# Patient Record
Sex: FEMALE | Race: WHITE | NOT HISPANIC OR LATINO | Employment: OTHER | URBAN - METROPOLITAN AREA
[De-identification: names, ages, dates, MRNs, and addresses within clinical notes are randomized per-mention and may not be internally consistent; named-entity substitution may affect disease eponyms.]

---

## 2023-03-07 ENCOUNTER — NURSING HOME VISIT (OUTPATIENT)
Dept: POST ACUTE CARE | Facility: EXTERNAL LOCATION | Age: 78
End: 2023-03-07
Payer: MEDICARE

## 2023-03-07 DIAGNOSIS — N30.00 ACUTE CYSTITIS WITHOUT HEMATURIA: Primary | ICD-10-CM

## 2023-03-07 PROBLEM — G31.83 DEMENTIA WITH LEWY BODIES (MULTI): Status: ACTIVE | Noted: 2023-03-07

## 2023-03-07 PROBLEM — I10 HTN (HYPERTENSION): Status: ACTIVE | Noted: 2023-03-07

## 2023-03-07 PROBLEM — D50.9 IDA (IRON DEFICIENCY ANEMIA): Status: ACTIVE | Noted: 2023-03-07

## 2023-03-07 PROBLEM — H35.53: Status: ACTIVE | Noted: 2023-03-07

## 2023-03-07 PROBLEM — D64.9 ANEMIA: Status: ACTIVE | Noted: 2023-03-07

## 2023-03-07 PROBLEM — J30.2 SEASONAL ALLERGIES: Status: ACTIVE | Noted: 2023-03-07

## 2023-03-07 PROBLEM — F78.A9: Status: ACTIVE | Noted: 2023-03-07

## 2023-03-07 PROBLEM — E55.9 VITAMIN D DEFICIENCY: Status: ACTIVE | Noted: 2023-03-07

## 2023-03-07 PROBLEM — Z15.1: Status: ACTIVE | Noted: 2023-03-07

## 2023-03-07 PROBLEM — I82.409 DVT (DEEP VENOUS THROMBOSIS) (MULTI): Status: RESOLVED | Noted: 2023-03-07 | Resolved: 2023-03-07

## 2023-03-07 PROBLEM — E78.5 HLD (HYPERLIPIDEMIA): Status: ACTIVE | Noted: 2023-03-07

## 2023-03-07 PROBLEM — E03.9 HYPOTHYROIDISM: Status: ACTIVE | Noted: 2023-03-07

## 2023-03-07 PROBLEM — E53.8 VITAMIN B12 DEFICIENCY: Status: ACTIVE | Noted: 2023-03-07

## 2023-03-07 PROBLEM — Q89.7: Status: ACTIVE | Noted: 2023-03-07

## 2023-03-07 PROBLEM — M54.2 CHRONIC NECK PAIN: Status: ACTIVE | Noted: 2023-03-07

## 2023-03-07 PROBLEM — F02.80 DEMENTIA WITH LEWY BODIES (MULTI): Status: ACTIVE | Noted: 2023-03-07

## 2023-03-07 PROBLEM — N18.30 CKD (CHRONIC KIDNEY DISEASE), STAGE III (MULTI): Status: ACTIVE | Noted: 2023-03-07

## 2023-03-07 PROBLEM — M19.90 OSTEOARTHRITIS: Status: ACTIVE | Noted: 2023-03-07

## 2023-03-07 PROBLEM — Q04.0: Status: ACTIVE | Noted: 2023-03-07

## 2023-03-07 PROBLEM — A04.72 C. DIFFICILE DIARRHEA: Status: RESOLVED | Noted: 2023-03-07 | Resolved: 2023-03-07

## 2023-03-07 PROBLEM — M35.00 SJOGREN'S DISEASE (MULTI): Status: ACTIVE | Noted: 2023-03-07

## 2023-03-07 PROBLEM — F32.A DEPRESSION: Status: ACTIVE | Noted: 2023-03-07

## 2023-03-07 PROBLEM — G89.29 CHRONIC NECK PAIN: Status: ACTIVE | Noted: 2023-03-07

## 2023-03-07 PROCEDURE — 99348 HOME/RES VST EST LOW MDM 30: CPT | Performed by: NURSE PRACTITIONER

## 2023-03-07 NOTE — LETTER
Subjective  Patient ID: Alpa Gonzalez is a 77 y.o. female who is assisted living/ home patient being seen and evaluated for multiple medical problems.    UA C&S reviewed and positive for UTI         Review of Systems    Objective  There were no vitals taken for this visit.    Physical Exam  Vitals and nursing note reviewed.   Constitutional:       General: She is not in acute distress.  HENT:      Head: Normocephalic and atraumatic.   Pulmonary:      Effort: Pulmonary effort is normal.   Musculoskeletal:         General: No swelling.   Skin:     General: Skin is warm and dry.   Neurological:      Mental Status: She is alert.   Psychiatric:         Mood and Affect: Mood normal.         Assessment/Plan  Problem List Items Addressed This Visit    None  Visit Diagnoses       Acute cystitis without hematuria    -  Primary    started on bactrim D BID x 7 days             Goals    None

## 2023-04-17 PROCEDURE — G0180 MD CERTIFICATION HHA PATIENT: HCPCS | Performed by: INTERNAL MEDICINE

## 2023-04-20 ENCOUNTER — NURSING HOME VISIT (OUTPATIENT)
Dept: POST ACUTE CARE | Facility: EXTERNAL LOCATION | Age: 78
End: 2023-04-20
Payer: MEDICARE

## 2023-04-20 DIAGNOSIS — T14.8XXA SCRATCHING: ICD-10-CM

## 2023-04-20 DIAGNOSIS — F02.B0 MODERATE LEWY BODY DEMENTIA WITHOUT BEHAVIORAL DISTURBANCE, PSYCHOTIC DISTURBANCE, MOOD DISTURBANCE, OR ANXIETY (MULTI): Primary | ICD-10-CM

## 2023-04-20 DIAGNOSIS — T14.8XXA: ICD-10-CM

## 2023-04-20 DIAGNOSIS — L29.9 ITCHING: ICD-10-CM

## 2023-04-20 DIAGNOSIS — G31.83 MODERATE LEWY BODY DEMENTIA WITHOUT BEHAVIORAL DISTURBANCE, PSYCHOTIC DISTURBANCE, MOOD DISTURBANCE, OR ANXIETY (MULTI): Primary | ICD-10-CM

## 2023-04-20 PROCEDURE — 99348 HOME/RES VST EST LOW MDM 30: CPT | Performed by: NURSE PRACTITIONER

## 2023-04-20 NOTE — PROGRESS NOTES
Subjective   Alpa Gonzalez is a 77 y.o. female who is assisted living/ home patient being seen and evaluated for multiple medical problems.    There were multiple medical problems reviewed. Medications, labs and orders reviewed. Patient seen and evaluated,   discussed with nursing staff,discussed with patient and provided information.     Persistent scratching of upper chest and LT shoulder area causing multiple open wounds, no infection or rash noted. Possibly secondary to anxiety. She states she feels itchy there. Current medications reviewed;  Duloxetine, bupropion, trazodone and levothyroxine. Recent blood work in Feb okay             Objective       Physical Exam  Vitals and nursing note reviewed.   Constitutional:       General: She is not in acute distress.  HENT:      Head: Normocephalic and atraumatic.   Pulmonary:      Effort: Pulmonary effort is normal.   Musculoskeletal:         General: No swelling.   Skin:     General: Skin is warm and dry.      Findings: No rash.      Comments: Multiple open wounds to upper chest and LT shoulder area  No rash or infection   Neurological:      Mental Status: She is alert. She is disoriented.   Psychiatric:         Mood and Affect: Mood normal.         Assessment/Plan   Problem List Items Addressed This Visit       Moderate Lewy body dementia without behavioral disturbance, psychotic disturbance, mood disturbance, or anxiety (CMS/[facility]) - Primary     Resident stable and problem well controlled. Will continue same orders and treatment         Multiple open wounds of multiple body regions     Non infected open wounds to upper chest and LT shoulder secondary from persistent scratching by resident, no rash, possibly dry skin verses a nervous habit/anxiety.  Start CerVa cream daily and after showers  Start cortisone 1% cream BID x 14 days            Other Visit Diagnoses       Itching        Scratching

## 2023-04-20 NOTE — ASSESSMENT & PLAN NOTE
Non infected open wounds to upper chest and LT shoulder secondary from persistent scratching by resident, no rash, possibly dry skin verses a nervous habit/anxiety.  Start CerVa cream daily and after showers  Start cortisone 1% cream BID x 14 days

## 2023-04-20 NOTE — LETTER
Patient: Alpa Gonzalez  : 1945    Encounter Date: 2023    Subjective  Alpa Gonzalez is a 77 y.o. female who is assisted living/ home patient being seen and evaluated for multiple medical problems.    There were multiple medical problems reviewed. Medications, labs and orders reviewed. Patient seen and evaluated,   discussed with nursing staff,discussed with patient and provided information.     Persistent scratching of upper chest and LT shoulder area causing multiple open wounds, no infection or rash noted. Possibly secondary to anxiety. She states she feels itchy there. Current medications reviewed;  Duloxetine, bupropion, trazodone and levothyroxine. Recent blood work in              Objective      Physical Exam  Vitals and nursing note reviewed.   Constitutional:       General: She is not in acute distress.  HENT:      Head: Normocephalic and atraumatic.   Pulmonary:      Effort: Pulmonary effort is normal.   Musculoskeletal:         General: No swelling.   Skin:     General: Skin is warm and dry.      Findings: No rash.      Comments: Multiple open wounds to upper chest and LT shoulder area  No rash or infection   Neurological:      Mental Status: She is alert. She is disoriented.   Psychiatric:         Mood and Affect: Mood normal.         Assessment/Plan  Problem List Items Addressed This Visit       Moderate Lewy body dementia without behavioral disturbance, psychotic disturbance, mood disturbance, or anxiety (CMS/[facility]) - Primary     Resident stable and problem well controlled. Will continue same orders and treatment         Multiple open wounds of multiple body regions     Non infected open wounds to upper chest and LT shoulder secondary from persistent scratching by resident, no rash, possibly dry skin verses a nervous habit/anxiety.  Start CerVa cream daily and after showers  Start cortisone 1% cream BID x 14 days            Other Visit Diagnoses       Itching        Scratching                   Electronically Signed By: ALIDA Adams-TRISTIN   4/20/23  2:51 PM

## 2023-04-25 ENCOUNTER — NURSING HOME VISIT (OUTPATIENT)
Dept: POST ACUTE CARE | Facility: EXTERNAL LOCATION | Age: 78
End: 2023-04-25
Payer: MEDICARE

## 2023-04-25 DIAGNOSIS — G31.83 MODERATE LEWY BODY DEMENTIA WITHOUT BEHAVIORAL DISTURBANCE, PSYCHOTIC DISTURBANCE, MOOD DISTURBANCE, OR ANXIETY (MULTI): ICD-10-CM

## 2023-04-25 DIAGNOSIS — T14.8XXA: Primary | ICD-10-CM

## 2023-04-25 DIAGNOSIS — F02.B0 MODERATE LEWY BODY DEMENTIA WITHOUT BEHAVIORAL DISTURBANCE, PSYCHOTIC DISTURBANCE, MOOD DISTURBANCE, OR ANXIETY (MULTI): ICD-10-CM

## 2023-04-25 PROCEDURE — 99349 HOME/RES VST EST MOD MDM 40: CPT | Performed by: NURSE PRACTITIONER

## 2023-04-25 NOTE — LETTER
Patient: Alpa Gonzalez  : 1945    Encounter Date: 2023    Subjective  Alpa Gonzalez is a 77 y.o. female who is assisted living/ home patient being seen and evaluated for multiple medical problems.    50% of time was spent on preparing to see the patient, performing exam or evaluation, coordination of care, ordering medications,tests and labs, referring and communicating with other health care providers , interpreting results, obtaining and reviewing history, counseling and educating the patient explanation of tests, medications and documenting clinical information  EMR. Time spent >35 minutes    Follow up for upper chest area itch and open wounds, not healing and still itching per resident. Consulted Dr Lozano for recommendations.              Objective      Physical Exam  Vitals and nursing note reviewed.   Constitutional:       General: She is not in acute distress.  HENT:      Head: Normocephalic and atraumatic.   Pulmonary:      Effort: Pulmonary effort is normal.   Musculoskeletal:         General: No swelling.   Skin:     General: Skin is warm and dry.      Findings: No rash.      Comments: Multiple open wounds to upper chest and LT shoulder area  No rash   Neurological:      Mental Status: She is alert. She is disoriented.   Psychiatric:         Mood and Affect: Mood normal.         Assessment/Plan  Problem List Items Addressed This Visit       Moderate Lewy body dementia without behavioral disturbance, psychotic disturbance, mood disturbance, or anxiety (CMS/[facility])     Resident stable and problem well controlled. Will continue same orders and treatment         Multiple open wounds of multiple body regions - Primary     Not healing and states area still itches, possible staph type infection but only on her upper chest area  DC cortisone cream  Start Benadryl itch cream BID x 14 days  Start Triamcinolone 0.05% BID x 14 days  Doxycycline 100 mg po Q12 hr               Electronically Signed  By: Jolene Fraser, APRN-CNP   4/25/23  5:57 PM

## 2023-04-25 NOTE — ASSESSMENT & PLAN NOTE
Not healing and states area still itches, possible staph type infection but only on her upper chest area  DC cortisone cream  Start Benadryl itch cream BID x 14 days  Start Triamcinolone 0.05% BID x 14 days  Doxycycline 100 mg po Q12 hr

## 2023-04-25 NOTE — PROGRESS NOTES
Subjective   Alpa Gonzalez is a 77 y.o. female who is assisted living/ home patient being seen and evaluated for multiple medical problems.    50% of time was spent on preparing to see the patient, performing exam or evaluation, coordination of care, ordering medications,tests and labs, referring and communicating with other health care providers , interpreting results, obtaining and reviewing history, counseling and educating the patient explanation of tests, medications and documenting clinical information  EMR. Time spent >35 minutes    Follow up for upper chest area itch and open wounds, not healing and still itching per resident. Consulted Dr Lozano for recommendations.              Objective       Physical Exam  Vitals and nursing note reviewed.   Constitutional:       General: She is not in acute distress.  HENT:      Head: Normocephalic and atraumatic.   Pulmonary:      Effort: Pulmonary effort is normal.   Musculoskeletal:         General: No swelling.   Skin:     General: Skin is warm and dry.      Findings: No rash.      Comments: Multiple open wounds to upper chest and LT shoulder area  No rash   Neurological:      Mental Status: She is alert. She is disoriented.   Psychiatric:         Mood and Affect: Mood normal.         Assessment/Plan   Problem List Items Addressed This Visit       Moderate Lewy body dementia without behavioral disturbance, psychotic disturbance, mood disturbance, or anxiety (CMS/[facility])     Resident stable and problem well controlled. Will continue same orders and treatment         Multiple open wounds of multiple body regions - Primary     Not healing and states area still itches, possible staph type infection but only on her upper chest area  DC cortisone cream  Start Benadryl itch cream BID x 14 days  Start Triamcinolone 0.05% BID x 14 days  Doxycycline 100 mg po Q12 hr

## 2023-05-11 ENCOUNTER — NURSING HOME VISIT (OUTPATIENT)
Dept: POST ACUTE CARE | Facility: EXTERNAL LOCATION | Age: 78
End: 2023-05-11
Payer: MEDICARE

## 2023-05-11 DIAGNOSIS — F02.B0 MODERATE LEWY BODY DEMENTIA WITHOUT BEHAVIORAL DISTURBANCE, PSYCHOTIC DISTURBANCE, MOOD DISTURBANCE, OR ANXIETY (MULTI): ICD-10-CM

## 2023-05-11 DIAGNOSIS — L01.00 IMPETIGO: ICD-10-CM

## 2023-05-11 DIAGNOSIS — G31.83 MODERATE LEWY BODY DEMENTIA WITHOUT BEHAVIORAL DISTURBANCE, PSYCHOTIC DISTURBANCE, MOOD DISTURBANCE, OR ANXIETY (MULTI): ICD-10-CM

## 2023-05-11 DIAGNOSIS — L29.9 LOCALIZED PRURITUS: Primary | ICD-10-CM

## 2023-05-11 PROCEDURE — 99349 HOME/RES VST EST MOD MDM 40: CPT | Performed by: NURSE PRACTITIONER

## 2023-05-11 NOTE — LETTER
Patient: Alpa Gonzalez  : 1945    Encounter Date: 2023    Subjective  Alpa Gonzalez is a 77 y.o. female who is assisted living/ home patient being seen and evaluated for multiple medical problems.    50% of time was spent on preparing to see the patient, performing exam or evaluation, coordination of care, ordering medications,tests and labs, referring and communicating with other health care providers , interpreting results, obtaining and reviewing history, counseling and educating the patient explanation of tests, medications and documenting clinical information  EMR. Time spent >35 minutes    Follow up on chest rash, she was on Doxycycline and it is somewhat improved. Most likely an impetigo. Will change cortisone topical to a topical antibiotic.              Objective  There were no vitals taken for this visit.    Physical Exam  Vitals and nursing note reviewed.   Constitutional:       General: She is not in acute distress.  HENT:      Head: Normocephalic and atraumatic.   Pulmonary:      Effort: Pulmonary effort is normal.   Musculoskeletal:         General: No swelling.   Skin:     General: Skin is warm and dry.      Findings: No rash.      Comments: Multiple lesions/vesicles remain but improved. Some erythema and crusting   Neurological:      Mental Status: She is alert. She is disoriented.   Psychiatric:         Mood and Affect: Mood normal.         Assessment/Plan  Problem List Items Addressed This Visit       Moderate Lewy body dementia without behavioral disturbance, psychotic disturbance, mood disturbance, or anxiety (CMS/[facility])     Resident stable and problem well controlled. Will continue same orders and treatment         Impetigo     Was started on doxycycline 2 weeks ago and topical cortisone, some improvement seen  DC topical cortisone  Start Mupirocin 2% topical apply to affected areas TID x 14 days   If not improving will consider another round of oral antibiotics          Other  Visit Diagnoses       Localized pruritus    -  Primary    Continue Hydroxyzine prn              Electronically Signed By: ALIDA Adams-CNP   5/11/23  4:45 PM

## 2023-05-11 NOTE — ASSESSMENT & PLAN NOTE
Was started on doxycycline 2 weeks ago and topical cortisone, some improvement seen  DC topical cortisone  Start Mupirocin 2% topical apply to affected areas TID x 14 days   If not improving will consider another round of oral antibiotics

## 2023-05-18 ENCOUNTER — NURSING HOME VISIT (OUTPATIENT)
Dept: POST ACUTE CARE | Facility: EXTERNAL LOCATION | Age: 78
End: 2023-05-18
Payer: MEDICARE

## 2023-05-18 DIAGNOSIS — F02.B0 MODERATE LEWY BODY DEMENTIA WITHOUT BEHAVIORAL DISTURBANCE, PSYCHOTIC DISTURBANCE, MOOD DISTURBANCE, OR ANXIETY (MULTI): Primary | ICD-10-CM

## 2023-05-18 DIAGNOSIS — L01.00 IMPETIGO: ICD-10-CM

## 2023-05-18 DIAGNOSIS — T14.8XXA: ICD-10-CM

## 2023-05-18 DIAGNOSIS — G31.83 MODERATE LEWY BODY DEMENTIA WITHOUT BEHAVIORAL DISTURBANCE, PSYCHOTIC DISTURBANCE, MOOD DISTURBANCE, OR ANXIETY (MULTI): Primary | ICD-10-CM

## 2023-05-18 PROCEDURE — 99348 HOME/RES VST EST LOW MDM 30: CPT | Performed by: NURSE PRACTITIONER

## 2023-05-18 NOTE — LETTER
Patient: Alpa Gonzalez  : 1945    Encounter Date: 2023    Subjective  Alpa Gonzalez is a 77 y.o. female who is assisted living/ home patient being seen and evaluated for multiple medical problems.    There were multiple medical problems reviewed.  No current issues or complaints. Medications, labs and orders reviewed. Patient seen and evaluated,   discussed with nursing staff,discussed with patient and provided information.     Resident being treated for open wounds, appearing like an impetigo. Treated with doxycycline and mupirocin topical             Objective      Physical Exam  Vitals and nursing note reviewed.   Constitutional:       General: She is not in acute distress.  HENT:      Head: Normocephalic and atraumatic.   Pulmonary:      Effort: Pulmonary effort is normal.   Skin:     General: Skin is warm and dry.      Findings: No rash.      Comments: Multiple lesions/vesicles remain but improved. Some erythema and crusting   Neurological:      Mental Status: She is alert. She is disoriented.   Psychiatric:         Mood and Affect: Mood normal.         Assessment/Plan  Problem List Items Addressed This Visit       Moderate Lewy body dementia without behavioral disturbance, psychotic disturbance, mood disturbance, or anxiety (CMS/[facility]) - Primary     Resident stable and problem well controlled. Will continue same orders and treatment         Multiple open wounds of multiple body regions    Impetigo     Improving and she states is not as itchy  Continue same treatment and reevaluate              Electronically Signed By: KYLIE Adams   23  3:25 PM

## 2023-05-18 NOTE — PROGRESS NOTES
Subjective   Alpa Gonzalez is a 77 y.o. female who is assisted living/ home patient being seen and evaluated for multiple medical problems.    There were multiple medical problems reviewed.  No current issues or complaints. Medications, labs and orders reviewed. Patient seen and evaluated,   discussed with nursing staff,discussed with patient and provided information.     Resident being treated for open wounds, appearing like an impetigo. Treated with doxycycline and mupirocin topical             Objective       Physical Exam  Vitals and nursing note reviewed.   Constitutional:       General: She is not in acute distress.  HENT:      Head: Normocephalic and atraumatic.   Pulmonary:      Effort: Pulmonary effort is normal.   Skin:     General: Skin is warm and dry.      Findings: No rash.      Comments: Multiple lesions/vesicles remain but improved. Some erythema and crusting   Neurological:      Mental Status: She is alert. She is disoriented.   Psychiatric:         Mood and Affect: Mood normal.         Assessment/Plan   Problem List Items Addressed This Visit       Moderate Lewy body dementia without behavioral disturbance, psychotic disturbance, mood disturbance, or anxiety (CMS/[facility]) - Primary     Resident stable and problem well controlled. Will continue same orders and treatment         Multiple open wounds of multiple body regions    Impetigo     Improving and she states is not as itchy  Continue same treatment and reevaluate

## 2023-05-23 ENCOUNTER — NURSING HOME VISIT (OUTPATIENT)
Dept: POST ACUTE CARE | Facility: EXTERNAL LOCATION | Age: 78
End: 2023-05-23
Payer: MEDICARE

## 2023-05-23 DIAGNOSIS — G31.83 MODERATE LEWY BODY DEMENTIA WITHOUT BEHAVIORAL DISTURBANCE, PSYCHOTIC DISTURBANCE, MOOD DISTURBANCE, OR ANXIETY (MULTI): Primary | ICD-10-CM

## 2023-05-23 DIAGNOSIS — L01.00 IMPETIGO: ICD-10-CM

## 2023-05-23 DIAGNOSIS — B37.2 CANDIDAL DERMATITIS: ICD-10-CM

## 2023-05-23 DIAGNOSIS — F02.B0 MODERATE LEWY BODY DEMENTIA WITHOUT BEHAVIORAL DISTURBANCE, PSYCHOTIC DISTURBANCE, MOOD DISTURBANCE, OR ANXIETY (MULTI): Primary | ICD-10-CM

## 2023-05-23 PROCEDURE — 99348 HOME/RES VST EST LOW MDM 30: CPT | Performed by: NURSE PRACTITIONER

## 2023-05-23 NOTE — PROGRESS NOTES
Subjective   Alpa Gonzalez is a 77 y.o. female who is assisted living/ home patient being seen and evaluated for multiple medical problems.    There were multiple medical problems reviewed.  No current issues or complaints. Medications, labs and orders reviewed. Patient seen and evaluated,   discussed with nursing staff,discussed with patient and provided information.       Upper chest rash improved greatly, has some mild rash under her breasts now             Objective   There were no vitals taken for this visit.    Physical Exam  Vitals and nursing note reviewed.   Constitutional:       General: She is not in acute distress.  HENT:      Head: Normocephalic and atraumatic.   Pulmonary:      Effort: Pulmonary effort is normal.   Skin:     General: Skin is warm and dry.      Findings: Rash present.      Comments: Multiple lesions/vesicles remain but improved  Yeast like rash under her breasts   Neurological:      Mental Status: She is alert. She is disoriented.   Psychiatric:         Mood and Affect: Mood normal.         Assessment/Plan   Problem List Items Addressed This Visit       Moderate Lewy body dementia without behavioral disturbance, psychotic disturbance, mood disturbance, or anxiety (CMS/[facility]) - Primary     Resident stable and problem well controlled. Will continue same orders and treatment         Impetigo     Improved greatly  Continue same treatment          Other Visit Diagnoses       Candidal dermatitis        start Nystatin / triamcinolone BID until clear

## 2023-05-23 NOTE — LETTER
Patient: Alpa Gonzalez  : 1945    Encounter Date: 2023    Subjective  Alpa Gonzalez is a 77 y.o. female who is assisted living/ home patient being seen and evaluated for multiple medical problems.    There were multiple medical problems reviewed.  No current issues or complaints. Medications, labs and orders reviewed. Patient seen and evaluated,   discussed with nursing staff,discussed with patient and provided information.       Upper chest rash improved greatly, has some mild rash under her breasts now             Objective  There were no vitals taken for this visit.    Physical Exam  Vitals and nursing note reviewed.   Constitutional:       General: She is not in acute distress.  HENT:      Head: Normocephalic and atraumatic.   Pulmonary:      Effort: Pulmonary effort is normal.   Skin:     General: Skin is warm and dry.      Findings: Rash present.      Comments: Multiple lesions/vesicles remain but improved  Yeast like rash under her breasts   Neurological:      Mental Status: She is alert. She is disoriented.   Psychiatric:         Mood and Affect: Mood normal.         Assessment/Plan  Problem List Items Addressed This Visit       Moderate Lewy body dementia without behavioral disturbance, psychotic disturbance, mood disturbance, or anxiety (CMS/[facility]) - Primary     Resident stable and problem well controlled. Will continue same orders and treatment         Impetigo     Improved greatly  Continue same treatment          Other Visit Diagnoses       Candidal dermatitis        start Nystatin / triamcinolone BID until clear              Electronically Signed By: KYLIE Adams   23  4:15 PM

## 2023-07-11 ENCOUNTER — NURSING HOME VISIT (OUTPATIENT)
Dept: POST ACUTE CARE | Facility: EXTERNAL LOCATION | Age: 78
End: 2023-07-11
Payer: MEDICARE

## 2023-07-11 DIAGNOSIS — M17.11 PRIMARY OSTEOARTHRITIS OF RIGHT KNEE: ICD-10-CM

## 2023-07-11 DIAGNOSIS — R52 PAIN: ICD-10-CM

## 2023-07-11 DIAGNOSIS — F02.B0 MODERATE LEWY BODY DEMENTIA WITHOUT BEHAVIORAL DISTURBANCE, PSYCHOTIC DISTURBANCE, MOOD DISTURBANCE, OR ANXIETY (MULTI): Primary | ICD-10-CM

## 2023-07-11 DIAGNOSIS — G31.83 MODERATE LEWY BODY DEMENTIA WITHOUT BEHAVIORAL DISTURBANCE, PSYCHOTIC DISTURBANCE, MOOD DISTURBANCE, OR ANXIETY (MULTI): Primary | ICD-10-CM

## 2023-07-11 PROBLEM — L01.00 IMPETIGO: Status: RESOLVED | Noted: 2023-05-11 | Resolved: 2023-07-11

## 2023-07-11 PROBLEM — T14.8XXA: Status: RESOLVED | Noted: 2023-04-20 | Resolved: 2023-07-11

## 2023-07-11 PROCEDURE — 99349 HOME/RES VST EST MOD MDM 40: CPT | Performed by: NURSE PRACTITIONER

## 2023-07-11 NOTE — LETTER
Patient: Alpa Gonzalez  : 1945    Encounter Date: 2023    Subjective  Alpa Gonzalez is a 77 y.o. female who is assisted living/ home patient being seen and evaluated for multiple medical problems.    Resident seen today for complaints of RT knee pain, X ray obtained and shows moderate DJD. She may benefit from PT/OT to help improve mobility and balance.     50% of time was spent on preparing to see the patient, performing exam or evaluation, coordination of care, ordering medications,tests and labs, referring and communicating with other health care providers , interpreting results, obtaining and reviewing history, tests, medications and documenting clinical information  EMR. Time spent >35 minutes             Objective      Physical Exam  Vitals and nursing note reviewed.   Constitutional:       General: She is not in acute distress.  HENT:      Head: Normocephalic and atraumatic.   Pulmonary:      Effort: Pulmonary effort is normal.   Musculoskeletal:         General: Tenderness present. No swelling, deformity or signs of injury.   Skin:     General: Skin is warm and dry.   Neurological:      Mental Status: She is alert. She is disoriented.   Psychiatric:         Mood and Affect: Mood normal.         Assessment/Plan  Problem List Items Addressed This Visit       Moderate Lewy body dementia without behavioral disturbance, psychotic disturbance, mood disturbance, or anxiety (CMS/HCC) - Primary     Patient stable, will continue same treatment and monitor. Nursing to inform CNP/MD of any changes in condition or new problems         Primary osteoarthritis of right knee     Consult PT/OT  Start Tylenol 1000mg po daily x 4 weeks           Other Visit Diagnoses       Pain                  Electronically Signed By: KYLIE Adams   23  4:31 PM

## 2023-07-11 NOTE — PROGRESS NOTES
Subjective   Alpa Gonzalez is a 77 y.o. female who is assisted living/ home patient being seen and evaluated for multiple medical problems.    Resident seen today for complaints of RT knee pain, X ray obtained and shows moderate DJD. She may benefit from PT/OT to help improve mobility and balance.     50% of time was spent on preparing to see the patient, performing exam or evaluation, coordination of care, ordering medications,tests and labs, referring and communicating with other health care providers , interpreting results, obtaining and reviewing history, tests, medications and documenting clinical information  EMR. Time spent >35 minutes             Objective       Physical Exam  Vitals and nursing note reviewed.   Constitutional:       General: She is not in acute distress.  HENT:      Head: Normocephalic and atraumatic.   Pulmonary:      Effort: Pulmonary effort is normal.   Musculoskeletal:         General: Tenderness present. No swelling, deformity or signs of injury.   Skin:     General: Skin is warm and dry.   Neurological:      Mental Status: She is alert. She is disoriented.   Psychiatric:         Mood and Affect: Mood normal.         Assessment/Plan   Problem List Items Addressed This Visit       Moderate Lewy body dementia without behavioral disturbance, psychotic disturbance, mood disturbance, or anxiety (CMS/HCC) - Primary     Patient stable, will continue same treatment and monitor. Nursing to inform CNP/MD of any changes in condition or new problems         Primary osteoarthritis of right knee     Consult PT/OT  Start Tylenol 1000mg po daily x 4 weeks           Other Visit Diagnoses       Pain

## 2023-08-15 ENCOUNTER — NURSING HOME VISIT (OUTPATIENT)
Dept: POST ACUTE CARE | Facility: EXTERNAL LOCATION | Age: 78
End: 2023-08-15
Payer: MEDICARE

## 2023-08-15 DIAGNOSIS — M17.11 PRIMARY OSTEOARTHRITIS OF RIGHT KNEE: Primary | ICD-10-CM

## 2023-08-15 PROCEDURE — 99348 HOME/RES VST EST LOW MDM 30: CPT | Performed by: INTERNAL MEDICINE

## 2023-08-15 NOTE — LETTER
Patient: Alpa Gonzalez  : 1945    Encounter Date: 08/15/2023    Follow-up visit  Patient is seen today because of request by staff concerning severe pain in the right knee.  This has been going on for a few weeks.  X-rays were negative for fracture but suggestive of DJD.  The patient was seen and currently her only significant complaint is severe pain in the right knee.    Medications and orders were reviewed.    Physical exam  Vital signs are stable and afebrile.  She is alert but a bit uncomfortable.  Both legs demonstrate mild to scant peripheral edema.  Patient is obese.  There is tenderness to the right knee upon palpation and attempted range of motion.  It is localized to the right knee.  There is no effusion or redness or deformity noted.    Assessment and care plan  Symptomatic osteoarthritis of the right knee.  We will increase her Tylenol to 1000 mg 3 times a day for 30 days then as needed.  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  We have also asked to have the patient scheduled with orthopedics for 1 more comprehensive evaluation and possible steroid injection or other interventions      Electronically Signed By: Angel Lozano DO   23 10:20 AM

## 2023-08-16 NOTE — PROGRESS NOTES
Follow-up visit  Patient is seen today because of request by staff concerning severe pain in the right knee.  This has been going on for a few weeks.  X-rays were negative for fracture but suggestive of DJD.  The patient was seen and currently her only significant complaint is severe pain in the right knee.    Medications and orders were reviewed.    Physical exam  Vital signs are stable and afebrile.  She is alert but a bit uncomfortable.  Both legs demonstrate mild to scant peripheral edema.  Patient is obese.  There is tenderness to the right knee upon palpation and attempted range of motion.  It is localized to the right knee.  There is no effusion or redness or deformity noted.    Assessment and care plan  Symptomatic osteoarthritis of the right knee.  We will increase her Tylenol to 1000 mg 3 times a day for 30 days then as needed.  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  We have also asked to have the patient scheduled with orthopedics for 1 more comprehensive evaluation and possible steroid injection or other interventions

## 2023-09-18 ENCOUNTER — NURSING HOME VISIT (OUTPATIENT)
Dept: POST ACUTE CARE | Facility: EXTERNAL LOCATION | Age: 78
End: 2023-09-18
Payer: MEDICARE

## 2023-09-18 VITALS
DIASTOLIC BLOOD PRESSURE: 62 MMHG | WEIGHT: 195 LBS | TEMPERATURE: 97.6 F | BODY MASS INDEX: 33.47 KG/M2 | HEART RATE: 70 BPM | SYSTOLIC BLOOD PRESSURE: 123 MMHG

## 2023-09-18 DIAGNOSIS — N18.31 STAGE 3A CHRONIC KIDNEY DISEASE (MULTI): ICD-10-CM

## 2023-09-18 DIAGNOSIS — I50.33 ACUTE ON CHRONIC DIASTOLIC CONGESTIVE HEART FAILURE (MULTI): ICD-10-CM

## 2023-09-18 DIAGNOSIS — I82.512 CHRONIC DEEP VEIN THROMBOSIS (DVT) OF FEMORAL VEIN OF LEFT LOWER EXTREMITY (MULTI): ICD-10-CM

## 2023-09-18 DIAGNOSIS — E66.9 OBESITY (BMI 30.0-34.9): ICD-10-CM

## 2023-09-18 DIAGNOSIS — I87.8 VENOUS STASIS: ICD-10-CM

## 2023-09-18 DIAGNOSIS — G31.83 MODERATE LEWY BODY DEMENTIA WITHOUT BEHAVIORAL DISTURBANCE, PSYCHOTIC DISTURBANCE, MOOD DISTURBANCE, OR ANXIETY (MULTI): Primary | ICD-10-CM

## 2023-09-18 DIAGNOSIS — F02.B0 MODERATE LEWY BODY DEMENTIA WITHOUT BEHAVIORAL DISTURBANCE, PSYCHOTIC DISTURBANCE, MOOD DISTURBANCE, OR ANXIETY (MULTI): Primary | ICD-10-CM

## 2023-09-18 PROBLEM — I10 PRIMARY HYPERTENSION: Status: ACTIVE | Noted: 2023-09-18

## 2023-09-18 PROBLEM — I78.1 TELANGIECTASIA: Status: ACTIVE | Noted: 2017-09-27

## 2023-09-18 PROBLEM — I82.412: Status: ACTIVE | Noted: 2020-02-10

## 2023-09-18 PROBLEM — R53.1 GENERALIZED WEAKNESS: Status: ACTIVE | Noted: 2021-07-21

## 2023-09-18 PROBLEM — K21.9 GERD (GASTROESOPHAGEAL REFLUX DISEASE): Status: ACTIVE | Noted: 2023-09-18

## 2023-09-18 PROBLEM — E66.813 OBESITY, CLASS III, BMI 40-49.9 (MORBID OBESITY): Status: RESOLVED | Noted: 2023-08-16 | Resolved: 2023-09-18

## 2023-09-18 PROBLEM — E03.8 OTHER SPECIFIED HYPOTHYROIDISM: Status: ACTIVE | Noted: 2023-09-18

## 2023-09-18 PROBLEM — S82.202A FRACTURE OF LEFT TIBIA: Status: RESOLVED | Noted: 2019-12-21 | Resolved: 2023-09-18

## 2023-09-18 PROBLEM — E66.813 OBESITY, CLASS III, BMI 40-49.9 (MORBID OBESITY): Status: ACTIVE | Noted: 2023-08-16

## 2023-09-18 PROBLEM — M19.90 OSTEOARTHRITIS: Status: ACTIVE | Noted: 2023-09-18

## 2023-09-18 PROBLEM — N18.30 CKD (CHRONIC KIDNEY DISEASE) STAGE 3, GFR 30-59 ML/MIN (MULTI): Status: ACTIVE | Noted: 2018-08-06

## 2023-09-18 PROBLEM — E66.811 OBESITY (BMI 30.0-34.9): Status: ACTIVE | Noted: 2023-09-18

## 2023-09-18 PROBLEM — E66.01 OBESITY, CLASS III, BMI 40-49.9 (MORBID OBESITY) (MULTI): Status: ACTIVE | Noted: 2023-08-16

## 2023-09-18 PROBLEM — H35.53: Status: ACTIVE | Noted: 2017-05-10

## 2023-09-18 PROBLEM — N17.9 AKI (ACUTE KIDNEY INJURY) (CMS-HCC): Status: RESOLVED | Noted: 2020-02-10 | Resolved: 2023-09-18

## 2023-09-18 PROBLEM — M06.9 RHEUMATOID ARTHRITIS (MULTI): Status: ACTIVE | Noted: 2023-09-18

## 2023-09-18 PROBLEM — E66.01 OBESITY, CLASS III, BMI 40-49.9 (MORBID OBESITY) (MULTI): Status: RESOLVED | Noted: 2023-08-16 | Resolved: 2023-09-18

## 2023-09-18 PROCEDURE — 99349 HOME/RES VST EST MOD MDM 40: CPT | Performed by: NURSE PRACTITIONER

## 2023-09-18 ASSESSMENT — ENCOUNTER SYMPTOMS
VOMITING: 0
SHORTNESS OF BREATH: 0
FATIGUE: 0
DIARRHEA: 0
CONSTIPATION: 0
NERVOUS/ANXIOUS: 0
WEAKNESS: 0
FEVER: 0
NAUSEA: 0
COUGH: 0
SLEEP DISTURBANCE: 0
AGITATION: 0

## 2023-09-18 NOTE — PROGRESS NOTES
Subjective   Alpa Gonzalez is a 77 y.o. female who is assisted living/ home patient being seen and evaluated for multiple medical problems.    Seen today after being sent to the hospital for increased LE edema on 8/15 and was discharged on 8/21 to Rehab at [facility] ([place]). 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 Time spent >35 minutes         Review of Systems   Constitutional:  Negative for fatigue and fever.   Respiratory:  Negative for cough and shortness of breath.    Cardiovascular:  Negative for chest pain and leg swelling.   Gastrointestinal:  Negative for constipation, diarrhea, nausea and vomiting.   Skin:  Negative for pallor and rash.   Neurological:  Negative for weakness.   Psychiatric/Behavioral:  Negative for agitation, behavioral problems and sleep disturbance. The patient is not nervous/anxious.        Objective   /62   Pulse 70   Temp 36.4 °C (97.6 °F)   Wt 88.5 kg (195 lb)   BMI 33.47 kg/m²     Physical Exam  Vitals and nursing note reviewed.   Constitutional:       General: She is not in acute distress.  HENT:      Head: Normocephalic and atraumatic.   Cardiovascular:      Rate and Rhythm: Normal rate and regular rhythm.   Pulmonary:      Effort: Pulmonary effort is normal.      Breath sounds: Normal breath sounds.   Musculoskeletal:         General: No swelling, deformity or signs of injury.      Right lower leg: No edema.      Left lower leg: No edema.   Skin:     General: Skin is warm and dry.   Neurological:      Mental  Status: She is alert. She is disoriented.   Psychiatric:         Mood and Affect: Mood normal.         Assessment/Plan   Problem List Items Addressed This Visit       Moderate Lewy body dementia without behavioral disturbance, psychotic disturbance, mood disturbance, or anxiety (CMS/HCC) - Primary     Patient stable, will continue same treatment and monitor. Nursing to inform CNP/MD of any changes in condition or new problems         Acute on chronic diastolic congestive heart failure (CMS/HCC)    Venous stasis    CKD (chronic kidney disease) stage 3, GFR 30-59 ml/min (CMS/[facility])    Obesity (BMI 30.0-34.9)    Chronic deep vein thrombosis (DVT) of femoral vein of left lower extremity (CMS/HCC)     On eliquis          Continue current treatment  Nurse to monitor and report any changes to MD or CNP

## 2023-09-18 NOTE — LETTER
Patient: Alpa Gonzalez  : 1945    Encounter Date: 2023    Subjective  Alpa Gonzalez is a 77 y.o. female who is assisted living/ home patient being seen and evaluated for multiple medical problems.    Seen today after being sent to the hospital for increased LE edema on 8/15 and was discharged on  to Rehab at [facility]. 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 Time spent >35 minutes         Review of Systems   Constitutional:  Negative for fatigue and fever.   Respiratory:  Negative for cough and shortness of breath.    Cardiovascular:  Negative for chest pain and leg swelling.   Gastrointestinal:  Negative for constipation, diarrhea, nausea and vomiting.   Skin:  Negative for pallor and rash.   Neurological:  Negative for weakness.   Psychiatric/Behavioral:  Negative for agitation, behavioral problems and sleep disturbance. The patient is not nervous/anxious.        Objective  /62   Pulse 70   Temp 36.4 °C (97.6 °F)   Wt 88.5 kg (195 lb)   BMI 33.47 kg/m²     Physical Exam  Vitals and nursing note reviewed.   Constitutional:       General: She is not in acute distress.  HENT:      Head: Normocephalic and atraumatic.   Cardiovascular:      Rate and Rhythm: Normal rate and regular rhythm.   Pulmonary:      Effort: Pulmonary effort is normal.      Breath sounds: Normal breath sounds.   Musculoskeletal:         General: No swelling, deformity or signs of injury.      Right lower leg: No edema.      Left lower leg: No edema.    Skin:     General: Skin is warm and dry.   Neurological:      Mental Status: She is alert. She is disoriented.   Psychiatric:         Mood and Affect: Mood normal.         Assessment/Plan  Problem List Items Addressed This Visit       Moderate Lewy body dementia without behavioral disturbance, psychotic disturbance, mood disturbance, or anxiety (CMS/HCC) - Primary     Patient stable, will continue same treatment and monitor. Nursing to inform CNP/MD of any changes in condition or new problems         Acute on chronic diastolic congestive heart failure (CMS/[facility])    Venous stasis    CKD (chronic kidney disease) stage 3, GFR 30-59 ml/min (CMS/[facility])    Obesity (BMI 30.0-34.9)    Chronic deep vein thrombosis (DVT) of femoral vein of left lower extremity (CMS/[facility])     On eliquis          Continue current treatment  Nurse to monitor and report any changes to MD or CNP      Electronically Signed By: ALIDA Adams-CNP   9/18/23  2:36 PM

## 2023-10-24 ENCOUNTER — NURSING HOME VISIT (OUTPATIENT)
Dept: POST ACUTE CARE | Facility: EXTERNAL LOCATION | Age: 78
End: 2023-10-24
Payer: MEDICARE

## 2023-10-24 DIAGNOSIS — H10.32 ACUTE CONJUNCTIVITIS OF LEFT EYE, UNSPECIFIED ACUTE CONJUNCTIVITIS TYPE: ICD-10-CM

## 2023-10-24 DIAGNOSIS — G31.83 MODERATE LEWY BODY DEMENTIA WITHOUT BEHAVIORAL DISTURBANCE, PSYCHOTIC DISTURBANCE, MOOD DISTURBANCE, OR ANXIETY (MULTI): Primary | ICD-10-CM

## 2023-10-24 DIAGNOSIS — F02.B0 MODERATE LEWY BODY DEMENTIA WITHOUT BEHAVIORAL DISTURBANCE, PSYCHOTIC DISTURBANCE, MOOD DISTURBANCE, OR ANXIETY (MULTI): Primary | ICD-10-CM

## 2023-10-24 PROCEDURE — 99348 HOME/RES VST EST LOW MDM 30: CPT | Performed by: NURSE PRACTITIONER

## 2023-10-24 NOTE — LETTER
Patient: Alpa Gonzalez  : 1945    Encounter Date: 10/24/2023    Subjective  Alpa Gonzalez is a 77 y.o. female who is assisted living/ home patient being seen and evaluated for multiple medical problems.    She has LT eye redness, itchy and some drainage.          Review of Systems   Unable to perform ROS: Dementia       Objective      Physical Exam  Vitals and nursing note reviewed.   Constitutional:       General: She is not in acute distress.  HENT:      Head: Normocephalic and atraumatic.   Eyes:      General:         Left eye: Discharge present.     Comments: Lt eye with redness to conjunctiva   Pulmonary:      Effort: Pulmonary effort is normal.   Musculoskeletal:         General: No swelling, deformity or signs of injury.      Right lower leg: No edema.      Left lower leg: No edema.   Skin:     General: Skin is warm and dry.   Neurological:      Mental Status: She is alert. She is disoriented.   Psychiatric:         Mood and Affect: Mood normal.         Assessment/Plan  Problem List Items Addressed This Visit       Moderate Lewy body dementia without behavioral disturbance, psychotic disturbance, mood disturbance, or anxiety (CMS/[facility]) - Primary     Patient stable, will continue same treatment and monitor. Nursing to inform CNP/MD of any changes in condition or new problems          Other Visit Diagnoses       Acute conjunctivitis of left eye, unspecified acute conjunctivitis type                  Electronically Signed By: ALIDA Adams-TRISTIN   10/24/23  3:45 PM

## 2023-10-24 NOTE — PROGRESS NOTES
Subjective   Alpa Gonzalez is a 77 y.o. female who is assisted living/ home patient being seen and evaluated for multiple medical problems.    She has LT eye redness, itchy and some drainage.          Review of Systems   Unable to perform ROS: Dementia       Objective       Physical Exam  Vitals and nursing note reviewed.   Constitutional:       General: She is not in acute distress.  HENT:      Head: Normocephalic and atraumatic.   Eyes:      General:         Left eye: Discharge present.     Comments: Lt eye with redness to conjunctiva   Pulmonary:      Effort: Pulmonary effort is normal.   Musculoskeletal:         General: No swelling, deformity or signs of injury.      Right lower leg: No edema.      Left lower leg: No edema.   Skin:     General: Skin is warm and dry.   Neurological:      Mental Status: She is alert. She is disoriented.   Psychiatric:         Mood and Affect: Mood normal.         Assessment/Plan   Problem List Items Addressed This Visit       Moderate Lewy body dementia without behavioral disturbance, psychotic disturbance, mood disturbance, or anxiety (CMS/HCC) - Primary     Patient stable, will continue same treatment and monitor. Nursing to inform CNP/MD of any changes in condition or new problems          Other Visit Diagnoses       Acute conjunctivitis of left eye, unspecified acute conjunctivitis type

## 2023-11-07 ENCOUNTER — NURSING HOME VISIT (OUTPATIENT)
Dept: POST ACUTE CARE | Facility: EXTERNAL LOCATION | Age: 78
End: 2023-11-07
Payer: MEDICARE

## 2023-11-07 DIAGNOSIS — H04.123 DRY EYE SYNDROME OF BOTH EYES: Primary | ICD-10-CM

## 2023-11-07 PROBLEM — H04.129 DRY EYE SYNDROME: Status: ACTIVE | Noted: 2023-11-07

## 2023-11-07 PROCEDURE — 99348 HOME/RES VST EST LOW MDM 30: CPT | Performed by: NURSE PRACTITIONER

## 2023-11-07 ASSESSMENT — ENCOUNTER SYMPTOMS
EYE ITCHING: 1
EYE PAIN: 0
EYE REDNESS: 1
EYE DISCHARGE: 0

## 2023-11-07 NOTE — PROGRESS NOTES
Subjective   Alpa Gonzalez is a 77 y.o. female who is assisted living/ home patient being seen and evaluated for multiple medical problems.    Seen today for complaints of eye redness and itching per nurse she is better today and was given systane eye drops, she denies drainage or itching today         Review of Systems   Eyes:  Positive for redness and itching. Negative for pain and discharge.       Objective       Physical Exam  Vitals and nursing note reviewed.   Constitutional:       General: She is not in acute distress.  HENT:      Head: Normocephalic and atraumatic.   Eyes:      Conjunctiva/sclera: Conjunctivae normal.   Pulmonary:      Effort: Pulmonary effort is normal.   Musculoskeletal:         General: No swelling, deformity or signs of injury.      Right lower leg: No edema.      Left lower leg: No edema.   Skin:     General: Skin is warm and dry.   Neurological:      Mental Status: She is alert. She is disoriented.   Psychiatric:         Mood and Affect: Mood normal.         Assessment/Plan   Problem List Items Addressed This Visit       Dry eye syndrome of both eyes - Primary     Start Systane eye drops QID PRN for dry, itchy eyes

## 2023-11-07 NOTE — LETTER
Patient: Alpa Gonzalez  : 1945    Encounter Date: 2023    Subjective  Alpa Gonzalez is a 77 y.o. female who is assisted living/ home patient being seen and evaluated for multiple medical problems.    Seen today for complaints of eye redness and itching per nurse she is better today and was given systane eye drops, she denies drainage or itching today         Review of Systems   Eyes:  Positive for redness and itching. Negative for pain and discharge.       Objective      Physical Exam  Vitals and nursing note reviewed.   Constitutional:       General: She is not in acute distress.  HENT:      Head: Normocephalic and atraumatic.   Eyes:      Conjunctiva/sclera: Conjunctivae normal.   Pulmonary:      Effort: Pulmonary effort is normal.   Musculoskeletal:         General: No swelling, deformity or signs of injury.      Right lower leg: No edema.      Left lower leg: No edema.   Skin:     General: Skin is warm and dry.   Neurological:      Mental Status: She is alert. She is disoriented.   Psychiatric:         Mood and Affect: Mood normal.         Assessment/Plan  Problem List Items Addressed This Visit       Dry eye syndrome of both eyes - Primary     Start Systane eye drops QID PRN for dry, itchy eyes              Electronically Signed By: ALIDA Adams-CNP   23  1:46 PM

## 2024-01-30 ENCOUNTER — NURSING HOME VISIT (OUTPATIENT)
Dept: POST ACUTE CARE | Facility: EXTERNAL LOCATION | Age: 79
End: 2024-01-30
Payer: MEDICARE

## 2024-01-30 DIAGNOSIS — F02.B0 MODERATE LEWY BODY DEMENTIA WITHOUT BEHAVIORAL DISTURBANCE, PSYCHOTIC DISTURBANCE, MOOD DISTURBANCE, OR ANXIETY (MULTI): Primary | ICD-10-CM

## 2024-01-30 DIAGNOSIS — R53.1 GENERAL WEAKNESS: ICD-10-CM

## 2024-01-30 DIAGNOSIS — R53.1 GENERALIZED WEAKNESS: ICD-10-CM

## 2024-01-30 DIAGNOSIS — G31.83 MODERATE LEWY BODY DEMENTIA WITHOUT BEHAVIORAL DISTURBANCE, PSYCHOTIC DISTURBANCE, MOOD DISTURBANCE, OR ANXIETY (MULTI): Primary | ICD-10-CM

## 2024-01-30 DIAGNOSIS — I82.512 CHRONIC DEEP VEIN THROMBOSIS (DVT) OF FEMORAL VEIN OF LEFT LOWER EXTREMITY (MULTI): ICD-10-CM

## 2024-01-30 DIAGNOSIS — I82.412 DEEP VEIN THROMBOSIS (DVT) OF FEMORAL VEIN OF LEFT LOWER EXTREMITY, UNSPECIFIED CHRONICITY (MULTI): ICD-10-CM

## 2024-01-30 DIAGNOSIS — E55.9 VITAMIN D DEFICIENCY: ICD-10-CM

## 2024-01-30 DIAGNOSIS — E53.8 VITAMIN B12 DEFICIENCY: ICD-10-CM

## 2024-01-30 DIAGNOSIS — F32.A DEPRESSION, UNSPECIFIED DEPRESSION TYPE: ICD-10-CM

## 2024-01-30 DIAGNOSIS — D50.8 IRON DEFICIENCY ANEMIA SECONDARY TO INADEQUATE DIETARY IRON INTAKE: ICD-10-CM

## 2024-01-30 PROCEDURE — 99349 HOME/RES VST EST MOD MDM 40: CPT | Performed by: INTERNAL MEDICINE

## 2024-01-30 NOTE — LETTER
Patient: Alpa Gonzalez  : 1945    Encounter Date: 2024    Follow-up visit  Patient has no complaints.  Staff however reports that the patient frequently continues to scratch her arms because of recurrent itching.  She is currently on moisturizing cream but used only sporadically.    Staff is also concerned that the patient sits all day long and does not mobilize.  She demonstrates generalized weakness and an unsteady gait    Medications orders and labs were reviewed.  It has been approximately a year since prior labs which were relatively unremarkable at the time    Review of systems  Patient may be a poor historian but she denies pain  She denies postural dizziness she denies shortness of breath cough fever chills  She denies any GI distress and there is no nausea vomiting diarrhea or constipation reported and no dysuria  Staff report that the patient's behaviors have been unremarkable and manageable    Physical exam  Vital signs are stable the patient is afebrile  She is morbidly obese but otherwise alert no apparent distress HEENT is grossly unremarkable  Lungs are clear heart rate and rhythm is regular  Abdomen is obese but soft nontender  No significant peripheral edema  Neurologically there is no focal deficits or tremors the patient is in a wheelchair and demonstrates some difficulty standing up walking and turning    Assessment and care plan  History of dementia with no significant behavioral issues reported currently we will continue current medications for this    She does have a history of significant DVTs for which she has been on Eliquis 5 mg twice a day.  However given her weakness risk of falls we will decrease her Eliquis to 2.5 mg twice a day.  She has significant DVTs in the past and because of her sedentary status and obesity we will continue low-dose Eliquis for now    Labs do need to be updated and we will obtain a CBC BMP magnesium and thyroid B12 folate iron panel and vitamin D  particularly given her supplements and history of vitamin D and B12 deficiency    Because she demonstrates general weakness and risk of falling we will ask PT and OT to evaluate and address this as feasible    She is on trazodone at bedtime we will discontinue melatonin 3 mg since this is of little benefit under the circumstances    Since the patient has continued itching of her arms and some elements of dry skin we will ask the staff to apply CeraVe a moisturizing cream daily on a routine basis and in addition every 6 hours as needed    Physical exam      Electronically Signed By: Angel Lozano DO   1/30/24  2:19 PM

## 2024-01-30 NOTE — PROGRESS NOTES
Follow-up visit  Patient has no complaints.  Staff however reports that the patient frequently continues to scratch her arms because of recurrent itching.  She is currently on moisturizing cream but used only sporadically.    Staff is also concerned that the patient sits all day long and does not mobilize.  She demonstrates generalized weakness and an unsteady gait    Medications orders and labs were reviewed.  It has been approximately a year since prior labs which were relatively unremarkable at the time    Review of systems  Patient may be a poor historian but she denies pain  She denies postural dizziness she denies shortness of breath cough fever chills  She denies any GI distress and there is no nausea vomiting diarrhea or constipation reported and no dysuria  Staff report that the patient's behaviors have been unremarkable and manageable    Physical exam  Vital signs are stable the patient is afebrile  She is morbidly obese but otherwise alert no apparent distress HEENT is grossly unremarkable  Lungs are clear heart rate and rhythm is regular  Abdomen is obese but soft nontender  No significant peripheral edema  Neurologically there is no focal deficits or tremors the patient is in a wheelchair and demonstrates some difficulty standing up walking and turning    Assessment and care plan  History of dementia with no significant behavioral issues reported currently we will continue current medications for this    She does have a history of significant DVTs for which she has been on Eliquis 5 mg twice a day.  However given her weakness risk of falls we will decrease her Eliquis to 2.5 mg twice a day.  She has significant DVTs in the past and because of her sedentary status and obesity we will continue low-dose Eliquis for now    Labs do need to be updated and we will obtain a CBC BMP magnesium and thyroid B12 folate iron panel and vitamin D particularly given her supplements and history of vitamin D and B12  deficiency    Because she demonstrates general weakness and risk of falling we will ask PT and OT to evaluate and address this as feasible    She is on trazodone at bedtime we will discontinue melatonin 3 mg since this is of little benefit under the circumstances    Since the patient has continued itching of her arms and some elements of dry skin we will ask the staff to apply CeraVe a moisturizing cream daily on a routine basis and in addition every 6 hours as needed    Physical exam

## 2024-04-12 ENCOUNTER — NURSING HOME VISIT (OUTPATIENT)
Dept: POST ACUTE CARE | Facility: EXTERNAL LOCATION | Age: 79
End: 2024-04-12
Payer: MEDICARE

## 2024-04-12 DIAGNOSIS — I87.8 VENOUS STASIS: ICD-10-CM

## 2024-04-12 DIAGNOSIS — D50.9 IRON DEFICIENCY ANEMIA, UNSPECIFIED IRON DEFICIENCY ANEMIA TYPE: ICD-10-CM

## 2024-04-12 DIAGNOSIS — I82.512 CHRONIC DEEP VEIN THROMBOSIS (DVT) OF FEMORAL VEIN OF LEFT LOWER EXTREMITY (MULTI): ICD-10-CM

## 2024-04-12 DIAGNOSIS — G31.83 MODERATE LEWY BODY DEMENTIA WITHOUT BEHAVIORAL DISTURBANCE, PSYCHOTIC DISTURBANCE, MOOD DISTURBANCE, OR ANXIETY (MULTI): Primary | ICD-10-CM

## 2024-04-12 DIAGNOSIS — F32.A DEPRESSION, UNSPECIFIED DEPRESSION TYPE: ICD-10-CM

## 2024-04-12 DIAGNOSIS — E55.9 VITAMIN D DEFICIENCY: ICD-10-CM

## 2024-04-12 DIAGNOSIS — I50.33 ACUTE ON CHRONIC DIASTOLIC CONGESTIVE HEART FAILURE (MULTI): ICD-10-CM

## 2024-04-12 DIAGNOSIS — R53.1 GENERAL WEAKNESS: ICD-10-CM

## 2024-04-12 DIAGNOSIS — F02.B0 MODERATE LEWY BODY DEMENTIA WITHOUT BEHAVIORAL DISTURBANCE, PSYCHOTIC DISTURBANCE, MOOD DISTURBANCE, OR ANXIETY (MULTI): Primary | ICD-10-CM

## 2024-04-12 PROCEDURE — 99349 HOME/RES VST EST MOD MDM 40: CPT | Performed by: INTERNAL MEDICINE

## 2024-04-12 NOTE — LETTER
Patient: Alpa Gonzalez  : 1945    Encounter Date: 2024    Follow-up visit  Patient is seen today in follow-up to family conference yesterday.  Family had some questions about medications and would like to have them reviewed.  They are also concerned because the patient seems to have lack of energy and loss of motivation to do anything.  Staff also confirms that she is quite sedentary and either unwilling or incapable of doing much independent activity.    Medications and orders were reviewed.    Review of systems  No other issues reported.  No respiratory issues cough fever chills or malaise  No GI issues reported  No shortness of breath chest pain    Labs ordered per last visit apparently were not done.  The patient has undergone physical therapy.    Physical exam  Blood pressure is 110/60, temp is 97.7, pulse is 80, pulse ox is 98% on room air  HEENT is grossly unremarkable.  Patient is alert seems reasonably oriented to person and place.  No apparent distress.  Lungs demonstrate slight dry bibasilar crackles.  Patient is morbidly obese.  Heart rate and rhythm is regular abdomen is soft nontender.  No significant peripheral edema is noted.  The patient's weights appear to have remained relatively stable    Assessment and care plan  Generalized weakness and fatigue.  Patient does have a history of hypothyroidism as well as anemia.  We will recheck her CBC BMP magnesium thyroid panel B12 iron panel and folic acid along with a vitamin D level.  The patient does have a history of some venous insufficiency which is low likely multifactorial.  We will continue Lasix for now and we discussed this with the family.  Depending on her lab and her future progress we will make further adjustments.  Patient's functional capabilities appear to be gradually declining and again this is for multiple reasons considering her dementia obesity venous insufficiency diastolic heart failure.  It may be that she is no  longer a candidate for assisted living and may need long-term care depending on her ADL requirements.  We will continue to monitor this with staff      Electronically Signed By: Angel Lozano DO   4/12/24 12:57 PM

## 2024-04-12 NOTE — PROGRESS NOTES
Follow-up visit  Patient is seen today in follow-up to family conference yesterday.  Family had some questions about medications and would like to have them reviewed.  They are also concerned because the patient seems to have lack of energy and loss of motivation to do anything.  Staff also confirms that she is quite sedentary and either unwilling or incapable of doing much independent activity.    Medications and orders were reviewed.    Review of systems  No other issues reported.  No respiratory issues cough fever chills or malaise  No GI issues reported  No shortness of breath chest pain    Labs ordered per last visit apparently were not done.  The patient has undergone physical therapy.    Physical exam  Blood pressure is 110/60, temp is 97.7, pulse is 80, pulse ox is 98% on room air  HEENT is grossly unremarkable.  Patient is alert seems reasonably oriented to person and place.  No apparent distress.  Lungs demonstrate slight dry bibasilar crackles.  Patient is morbidly obese.  Heart rate and rhythm is regular abdomen is soft nontender.  No significant peripheral edema is noted.  The patient's weights appear to have remained relatively stable    Assessment and care plan  Generalized weakness and fatigue.  Patient does have a history of hypothyroidism as well as anemia.  We will recheck her CBC BMP magnesium thyroid panel B12 iron panel and folic acid along with a vitamin D level.  The patient does have a history of some venous insufficiency which is low likely multifactorial.  We will continue Lasix for now and we discussed this with the family.  Depending on her lab and her future progress we will make further adjustments.  Patient's functional capabilities appear to be gradually declining and again this is for multiple reasons considering her dementia obesity venous insufficiency diastolic heart failure.  It may be that she is no longer a candidate for assisted living and may need long-term care depending on  her ADL requirements.  We will continue to monitor this with staff

## 2024-05-28 ENCOUNTER — NURSING HOME VISIT (OUTPATIENT)
Dept: POST ACUTE CARE | Facility: EXTERNAL LOCATION | Age: 79
End: 2024-05-28
Payer: MEDICARE

## 2024-05-28 VITALS
TEMPERATURE: 97.6 F | BODY MASS INDEX: 35.69 KG/M2 | OXYGEN SATURATION: 97 % | HEART RATE: 85 BPM | WEIGHT: 207.9 LBS | SYSTOLIC BLOOD PRESSURE: 125 MMHG | DIASTOLIC BLOOD PRESSURE: 64 MMHG

## 2024-05-28 DIAGNOSIS — F32.A DEPRESSION, UNSPECIFIED DEPRESSION TYPE: ICD-10-CM

## 2024-05-28 DIAGNOSIS — E03.8 OTHER SPECIFIED HYPOTHYROIDISM: ICD-10-CM

## 2024-05-28 DIAGNOSIS — E53.8 VITAMIN B12 DEFICIENCY: ICD-10-CM

## 2024-05-28 DIAGNOSIS — D50.8 IRON DEFICIENCY ANEMIA SECONDARY TO INADEQUATE DIETARY IRON INTAKE: ICD-10-CM

## 2024-05-28 DIAGNOSIS — I82.512 CHRONIC DEEP VEIN THROMBOSIS (DVT) OF FEMORAL VEIN OF LEFT LOWER EXTREMITY (MULTI): ICD-10-CM

## 2024-05-28 DIAGNOSIS — Z00.00 ROUTINE GENERAL MEDICAL EXAMINATION AT HEALTH CARE FACILITY: ICD-10-CM

## 2024-05-28 DIAGNOSIS — Q04.0: ICD-10-CM

## 2024-05-28 DIAGNOSIS — Q89.7: ICD-10-CM

## 2024-05-28 DIAGNOSIS — H35.53: ICD-10-CM

## 2024-05-28 DIAGNOSIS — R53.1 GENERAL WEAKNESS: ICD-10-CM

## 2024-05-28 DIAGNOSIS — E66.9 OBESITY (BMI 30.0-34.9): ICD-10-CM

## 2024-05-28 DIAGNOSIS — F78.A9: ICD-10-CM

## 2024-05-28 DIAGNOSIS — I50.33 ACUTE ON CHRONIC DIASTOLIC CONGESTIVE HEART FAILURE (MULTI): ICD-10-CM

## 2024-05-28 DIAGNOSIS — G31.83 MODERATE LEWY BODY DEMENTIA WITHOUT BEHAVIORAL DISTURBANCE, PSYCHOTIC DISTURBANCE, MOOD DISTURBANCE, OR ANXIETY (MULTI): Primary | ICD-10-CM

## 2024-05-28 DIAGNOSIS — N18.31 STAGE 3A CHRONIC KIDNEY DISEASE (MULTI): ICD-10-CM

## 2024-05-28 DIAGNOSIS — E55.9 VITAMIN D DEFICIENCY: ICD-10-CM

## 2024-05-28 DIAGNOSIS — F02.B0 MODERATE LEWY BODY DEMENTIA WITHOUT BEHAVIORAL DISTURBANCE, PSYCHOTIC DISTURBANCE, MOOD DISTURBANCE, OR ANXIETY (MULTI): Primary | ICD-10-CM

## 2024-05-28 PROCEDURE — G0439 PPPS, SUBSEQ VISIT: HCPCS | Performed by: INTERNAL MEDICINE

## 2024-05-28 PROCEDURE — 99348 HOME/RES VST EST LOW MDM 30: CPT | Performed by: INTERNAL MEDICINE

## 2024-05-28 ASSESSMENT — ACTIVITIES OF DAILY LIVING (ADL)
GROCERY_SHOPPING: TOTAL CARE
DOING_HOUSEWORK: TOTAL CARE
DRESSING: DEPENDENT
BATHING: DEPENDENT

## 2024-05-28 NOTE — PROGRESS NOTES
Subjective   Reason for Visit: Alpa Gonzalez is an 78 y.o. female here for a Medicare Wellness visit.      Patient also seen in follow-up to staff concerns that she has continued to decline functionally and is requiring more assistance and may not be a long-term candidate for assisted living and may need long-term care    Medications and orders were reviewed.    Patient was recently treated for UTI with no continued issues reported         HPI    Patient Care Team:  German David MD as PCP - General     Review of Systems  Patient denies any pain feels relatively well today  No significant postural dizziness reported  No shortness of breath fever chills or cough  No GI distress nausea or vomiting or diarrhea or constipation  No other issues reported by staff other than patient's generalized weakness and declining functional capabilities  Objective   Vitals:  /64   Pulse 85   Temp 36.4 °C (97.6 °F)   Wt 94.3 kg (207 lb 14.4 oz)   SpO2 97%   BMI 35.69 kg/m²       Physical Exam  Patient is obese resting in a chair no apparent distress she is alert pleasant and conversant  HEENT is grossly unremarkable  Lungs are clear  Heart rate and rhythm is regular  Abdomen is soft nontender  No significant pedal edema  Neurologically generalized weakness is noted but no focal deficits or tremors  Assessment/Plan   Problem List Items Addressed This Visit       Moderate Lewy body dementia without behavioral disturbance, psychotic disturbance, mood disturbance, or anxiety (Multi) - Primary    Depression    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 Continue levothyroxine         Obesity (BMI 30.0-34.9)    Chronic deep vein thrombosis (DVT) of femoral vein of left lower extremity (Multi)     Other Visit Diagnoses       Routine general medical examination at health care facility            Assessment and care plan  Overall the patient does appear stable.  Since she has a history of hypothyroidism and a recent TSH was slightly elevated we will increase her levothyroxine to 100 mcg daily in hopes that this may improve some of her functionality.  B12 is borderline low and we will place her on B12 500 mcg daily.  We will recheck her TSH and free T4 along with a B12 in several weeks to see if this has any impact on her functional capabilities.  Her iron levels were quite good with no significant anemia and we will discontinue her iron supplements.  We will also discontinue as needed hydroxyzine for itching since there appears to be no current skin conditions and will wish to avoid any adverse side effects    If the patient's functional capabilities do not improve she may indeed need to be placed in long-term care where there are more resources available

## 2024-05-28 NOTE — LETTER
Patient: Alpa Gonzalez  : 1945    Encounter Date: 2024    Subjective  Reason for Visit: Alpa Gonzalez is an 78 y.o. female here for a Medicare Wellness visit.      Patient also seen in follow-up to staff concerns that she has continued to decline functionally and is requiring more assistance and may not be a long-term candidate for assisted living and may need long-term care    Medications and orders were reviewed.    Patient was recently treated for UTI with no continued issues reported         HPI    Patient Care Team:  German David MD as PCP - General     Review of Systems  Patient denies any pain feels relatively well today  No significant postural dizziness reported  No shortness of breath fever chills or cough  No GI distress nausea or vomiting or diarrhea or constipation  No other issues reported by staff other than patient's generalized weakness and declining functional capabilities  Objective  Vitals:  /64   Pulse 85   Temp 36.4 °C (97.6 °F)   Wt 94.3 kg (207 lb 14.4 oz)   SpO2 97%   BMI 35.69 kg/m²       Physical Exam  Patient is obese resting in a chair no apparent distress she is alert pleasant and conversant  HEENT is grossly unremarkable  Lungs are clear  Heart rate and rhythm is regular  Abdomen is soft nontender  No significant pedal edema  Neurologically generalized weakness is noted but no focal deficits or tremors  Assessment/Plan  Problem List Items Addressed This Visit       Moderate Lewy body dementia without behavioral disturbance, psychotic disturbance, mood disturbance, or anxiety (Multi) - Primary    Depression    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 Continue levothyroxine         Obesity (BMI 30.0-34.9)    Chronic deep vein thrombosis (DVT) of femoral vein of left lower extremity (Multi)     Other Visit Diagnoses       Routine general medical examination at health care facility            Assessment and care plan  Overall the patient does appear stable.  Since she has a history of hypothyroidism and a recent TSH was slightly elevated we will increase her levothyroxine to 100 mcg daily in hopes that this may improve some of her functionality.  B12 is borderline low and we will place her on B12 500 mcg daily.  We will recheck her TSH and free T4 along with a B12 in several weeks to see if this has any impact on her functional capabilities.  Her iron levels were quite good with no significant anemia and we will discontinue her iron supplements.  We will also discontinue as needed hydroxyzine for itching since there appears to be no current skin conditions and will wish to avoid any adverse side effects    If the patient's functional capabilities do not improve she may indeed need to be placed in long-term care where there are more resources available           Electronically Signed By: Angel Lozano DO   5/28/24  2:34 PM

## 2024-06-07 ENCOUNTER — NURSING HOME VISIT (OUTPATIENT)
Dept: POST ACUTE CARE | Facility: EXTERNAL LOCATION | Age: 79
End: 2024-06-07
Payer: MEDICARE

## 2024-06-07 DIAGNOSIS — Z91.81 HISTORY OF RECENT FALL: ICD-10-CM

## 2024-06-07 DIAGNOSIS — N30.00 ACUTE CYSTITIS WITHOUT HEMATURIA: ICD-10-CM

## 2024-06-07 DIAGNOSIS — I82.512 CHRONIC DEEP VEIN THROMBOSIS (DVT) OF FEMORAL VEIN OF LEFT LOWER EXTREMITY (MULTI): Primary | ICD-10-CM

## 2024-06-07 DIAGNOSIS — N93.9 VAGINAL BLEEDING: ICD-10-CM

## 2024-06-07 PROCEDURE — 99348 HOME/RES VST EST LOW MDM 30: CPT | Performed by: INTERNAL MEDICINE

## 2024-06-07 NOTE — LETTER
Patient: Alpa Gonzalez  : 1945    Encounter Date: 2024    Follow-up visit  Patient seen today after return from the hospital few days ago.  She apparently fell with some abrasion to her right arm.  Was also reported prior to sending her to the hospital that she was having some vaginal bleeding.  In the hospital they found that she had vaginal polyp and was instructed to follow-up with GYN.  Currently there is no further bleeding noted.  She remains on her Eliquis at a reduced dose of 2.5 mg twice a day    In the hospital they also found a UTI which was treated and they thought that she was also somewhat volume deplete pleated dehydrated and had a UTI.    Current medications and orders are reviewed.  She is completing a course of Macrodantin.    Review of systems  No other issues reported.  The patient denies any significant abdominal pain nausea vomiting diarrhea or dysuria no other issues reported by staff.    Physical exam  Vital signs appear to be stable with blood pressure 127/69 temp 97.9 pulse 72 pulse ox 97% on room air  HEENT is grossly unremarkable her color is good she is in a chair pleasant with no obvious discomfort or distress  Lungs are clear heart rate and rhythm is regular  Abdomen soft nontender without guarding or tenderness and again no report of current vaginal bleeding    Assessment and care plan  Vaginal bleeding.  She has a vaginal polyp or neoplasm which she will follow-up with her consulting GYN.  Will ask the staff to make sure that this happens.  In the meantime since there is no further vaginal bleeding and she has a history of recurrent DVTs we will continue the lower dose of Eliquis for now.    We will complete her course of antibiotics and monitor      Electronically Signed By: Angel Lozano DO   24  9:56 AM

## 2024-06-07 NOTE — PROGRESS NOTES
Follow-up visit  Patient seen today after return from the hospital few days ago.  She apparently fell with some abrasion to her right arm.  Was also reported prior to sending her to the hospital that she was having some vaginal bleeding.  In the hospital they found that she had vaginal polyp and was instructed to follow-up with GYN.  Currently there is no further bleeding noted.  She remains on her Eliquis at a reduced dose of 2.5 mg twice a day    In the hospital they also found a UTI which was treated and they thought that she was also somewhat volume deplete pleated dehydrated and had a UTI.    Current medications and orders are reviewed.  She is completing a course of Macrodantin.    Review of systems  No other issues reported.  The patient denies any significant abdominal pain nausea vomiting diarrhea or dysuria no other issues reported by staff.    Physical exam  Vital signs appear to be stable with blood pressure 127/69 temp 97.9 pulse 72 pulse ox 97% on room air  HEENT is grossly unremarkable her color is good she is in a chair pleasant with no obvious discomfort or distress  Lungs are clear heart rate and rhythm is regular  Abdomen soft nontender without guarding or tenderness and again no report of current vaginal bleeding    Assessment and care plan  Vaginal bleeding.  She has a vaginal polyp or neoplasm which she will follow-up with her consulting GYN.  Will ask the staff to make sure that this happens.  In the meantime since there is no further vaginal bleeding and she has a history of recurrent DVTs we will continue the lower dose of Eliquis for now.    We will complete her course of antibiotics and monitor

## 2024-06-11 ENCOUNTER — NURSING HOME VISIT (OUTPATIENT)
Dept: POST ACUTE CARE | Facility: EXTERNAL LOCATION | Age: 79
End: 2024-06-11
Payer: MEDICARE

## 2024-06-11 DIAGNOSIS — E66.9 OBESITY (BMI 30.0-34.9): ICD-10-CM

## 2024-06-11 DIAGNOSIS — R53.1 GENERAL WEAKNESS: ICD-10-CM

## 2024-06-11 DIAGNOSIS — I82.512 CHRONIC DEEP VEIN THROMBOSIS (DVT) OF FEMORAL VEIN OF LEFT LOWER EXTREMITY (MULTI): ICD-10-CM
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
## 2024-06-11 DIAGNOSIS — G31.83 MODERATE LEWY BODY DEMENTIA WITHOUT BEHAVIORAL DISTURBANCE, PSYCHOTIC DISTURBANCE, MOOD DISTURBANCE, OR ANXIETY (MULTI): Primary | ICD-10-CM

## 2024-06-11 DIAGNOSIS — F02.B0 MODERATE LEWY BODY DEMENTIA WITHOUT BEHAVIORAL DISTURBANCE, PSYCHOTIC DISTURBANCE, MOOD DISTURBANCE, OR ANXIETY (MULTI): Primary | ICD-10-CM

## 2024-06-11 DIAGNOSIS — M79.604 RIGHT LEG PAIN: ICD-10-CM

## 2024-06-11 PROCEDURE — 99348 HOME/RES VST EST LOW MDM 30: CPT | Performed by: INTERNAL MEDICINE

## 2024-06-11 NOTE — LETTER
Patient: Alpa Gonzalez  : 1945    Encounter Date: 2024    Follow-up visit  Patient seen today because of concern by staff with the patient's complaint of right leg pain.  Apparently she fell a couple days ago.  No obvious injury and seems to be doing okay except for some bruising to her right arm.  However today she complains of right leg pain.    Review of systems  Patient has no other specific complaints but does say that her right leg hurts.  When asked to point she points to the area approximately around her knee and up towards her thigh.    Case was also discussed with the patient's son who feels that she has been somewhat delusional or hallucinatory lately and this usually occurs with a UTI.  In spite of them treating her recently for a UTI in the hospital he is still concerned that she may have 1    Physical exam  Vital signs are stable the patient is afebrile  Patient is alert pleasant no apparent distress.  HEENT is grossly unremarkable  Arms are unremarkable with range of motion within normal limits no pain elicited or tenderness.  Tenderness about the right knee.  No swelling or deformity noted no discoloration.  Pain with attempted range of motion which appears to localize around the right knee but it is uncertain whether it also occurred from the femur by.    Assessment and care plan  Recent fall.  Cannot rule out occult fracture of the right hip and right knee.  Discussed with the patient's son.  Will obtain x-rays of both the hip and the knee and monitor accordingly.    Will also obtain a repeat UA with [facility]      Electronically Signed By: Angel Lozano DO   24 12:22 PM

## 2024-06-11 NOTE — PROGRESS NOTES
Follow-up visit  Patient seen today because of concern by staff with the patient's complaint of right leg pain.  Apparently she fell a couple days ago.  No obvious injury and seems to be doing okay except for some bruising to her right arm.  However today she complains of right leg pain.    Review of systems  Patient has no other specific complaints but does say that her right leg hurts.  When asked to point she points to the area approximately around her knee and up towards her thigh.    Case was also discussed with the patient's son who feels that she has been somewhat delusional or hallucinatory lately and this usually occurs with a UTI.  In spite of them treating her recently for a UTI in the hospital he is still concerned that she may have 1    Physical exam  Vital signs are stable the patient is afebrile  Patient is alert pleasant no apparent distress.  HEENT is grossly unremarkable  Arms are unremarkable with range of motion within normal limits no pain elicited or tenderness.  Tenderness about the right knee.  No swelling or deformity noted no discoloration.  Pain with attempted range of motion which appears to localize around the right knee but it is uncertain whether it also occurred from the femur by.    Assessment and care plan  Recent fall.  Cannot rule out occult fracture of the right hip and right knee.  Discussed with the patient's son.  Will obtain x-rays of both the hip and the knee and monitor accordingly.    Will also obtain a repeat UA with [facility]

## 2024-06-13 PROCEDURE — G0180 MD CERTIFICATION HHA PATIENT: HCPCS | Performed by: INTERNAL MEDICINE

## 2024-06-25 ENCOUNTER — NURSING HOME VISIT (OUTPATIENT)
Dept: POST ACUTE CARE | Facility: EXTERNAL LOCATION | Age: 79
End: 2024-06-25
Payer: MEDICARE

## 2024-06-25 DIAGNOSIS — R41.82 ALTERED MENTAL STATUS, UNSPECIFIED ALTERED MENTAL STATUS TYPE: ICD-10-CM

## 2024-06-25 DIAGNOSIS — F02.B0 MODERATE LEWY BODY DEMENTIA WITHOUT BEHAVIORAL DISTURBANCE, PSYCHOTIC DISTURBANCE, MOOD DISTURBANCE, OR ANXIETY (MULTI): Primary | ICD-10-CM

## 2024-06-25 DIAGNOSIS — I50.33 ACUTE ON CHRONIC DIASTOLIC CONGESTIVE HEART FAILURE (MULTI): ICD-10-CM

## 2024-06-25 DIAGNOSIS — R09.02 HYPOXIA: ICD-10-CM

## 2024-06-25 DIAGNOSIS — G31.83 MODERATE LEWY BODY DEMENTIA WITHOUT BEHAVIORAL DISTURBANCE, PSYCHOTIC DISTURBANCE, MOOD DISTURBANCE, OR ANXIETY (MULTI): Primary | ICD-10-CM

## 2024-06-25 PROCEDURE — 99348 HOME/RES VST EST LOW MDM 30: CPT | Performed by: INTERNAL MEDICINE

## 2024-06-25 NOTE — PROGRESS NOTES
Follow-up visit  Staff asked that the patient be seen due to drop in pulse ox on room air.  They also noted a change in mental status with difficulty arousing the patient.    Medications and orders were reviewed.    On physical exam  Blood pressure is 123/70 temp is 97 1 pulse is 70  Pulse ox was reported to be 88% on room air  The patient is sleepy arouses somewhat to verbal stimuli but appears quite obtunded.  Lungs demonstrate bibasilar crackles.  Heart rate and rhythm is regular.  Mild amount of pedal edema.  Neurologically the patient demonstrates no specific focal deficits or tremors    Assessment and care plan  Altered mental status of uncertain etiology.  It is possible that the patient has underlying sepsis.  There is a level of hypoxia which does not entirely explain this may but may be a result of her obtundation.  We will transfer the patient out by EMS

## 2024-06-25 NOTE — LETTER
Patient: Alpa Gonzalez  : 1945    Encounter Date: 2024    Follow-up visit  Staff asked that the patient be seen due to drop in pulse ox on room air.  They also noted a change in mental status with difficulty arousing the patient.    Medications and orders were reviewed.    On physical exam  Blood pressure is 123/70 temp is 97 1 pulse is 70  Pulse ox was reported to be 88% on room air  The patient is sleepy arouses somewhat to verbal stimuli but appears quite obtunded.  Lungs demonstrate bibasilar crackles.  Heart rate and rhythm is regular.  Mild amount of pedal edema.  Neurologically the patient demonstrates no specific focal deficits or tremors    Assessment and care plan  Altered mental status of uncertain etiology.  It is possible that the patient has underlying sepsis.  There is a level of hypoxia which does not entirely explain this may but may be a result of her obtundation.  We will transfer the patient out by EMS      Electronically Signed By: Angel Lozano DO   24  1:00 PM

## 2024-08-15 PROCEDURE — G0180 MD CERTIFICATION HHA PATIENT: HCPCS | Performed by: INTERNAL MEDICINE

## 2024-10-21 ENCOUNTER — NURSING HOME VISIT (OUTPATIENT)
Dept: POST ACUTE CARE | Facility: EXTERNAL LOCATION | Age: 79
End: 2024-10-21
Payer: MEDICARE

## 2024-10-21 DIAGNOSIS — B35.1 ONYCHOMYCOSIS: ICD-10-CM

## 2024-10-21 DIAGNOSIS — I10 PRIMARY HYPERTENSION: ICD-10-CM

## 2024-10-21 DIAGNOSIS — M06.9 RHEUMATOID ARTHRITIS, INVOLVING UNSPECIFIED SITE, UNSPECIFIED WHETHER RHEUMATOID FACTOR PRESENT (MULTI): ICD-10-CM

## 2024-10-21 DIAGNOSIS — E03.8 OTHER SPECIFIED HYPOTHYROIDISM: ICD-10-CM

## 2024-10-21 DIAGNOSIS — F02.B0 MODERATE LEWY BODY DEMENTIA WITHOUT BEHAVIORAL DISTURBANCE, PSYCHOTIC DISTURBANCE, MOOD DISTURBANCE, OR ANXIETY (MULTI): ICD-10-CM

## 2024-10-21 DIAGNOSIS — I82.512 CHRONIC DEEP VEIN THROMBOSIS (DVT) OF FEMORAL VEIN OF LEFT LOWER EXTREMITY (MULTI): ICD-10-CM

## 2024-10-21 DIAGNOSIS — N18.31 STAGE 3A CHRONIC KIDNEY DISEASE (MULTI): ICD-10-CM

## 2024-10-21 DIAGNOSIS — G31.83 MODERATE LEWY BODY DEMENTIA WITHOUT BEHAVIORAL DISTURBANCE, PSYCHOTIC DISTURBANCE, MOOD DISTURBANCE, OR ANXIETY (MULTI): ICD-10-CM

## 2024-10-21 DIAGNOSIS — I50.33 ACUTE ON CHRONIC DIASTOLIC CONGESTIVE HEART FAILURE: Primary | ICD-10-CM

## 2024-10-21 PROCEDURE — 99348 HOME/RES VST EST LOW MDM 30: CPT | Performed by: INTERNAL MEDICINE

## 2024-10-21 NOTE — PROGRESS NOTES
Follow-up visit  Patient seen today in follow-up to her multiple medical problems.  She did undergo voice stay at the skilled nursing facility because of her debilitated status and need for additional assistance.  She did receive rehab and was transferred back to assisted living.  Since then there have been no specific issues other than staff concerned about some dystrophic nails.    Medications orders and prior labs were reviewed    Review of systems patient has no complaints.  No other issues reported by staff    Physical exam  Vital signs are stable the patient is afebrile.  She is obese.  Lungs are clear heart rate and rhythm is regular abdomen is soft nontender no significant peripheral edema  A few of her fingernails demonstrate dystrophic changes consistent with onychomycosis.    Assessment and care plan  Onychomycosis of the fingernails.  We will try ciclopirox 8% solution to the affected nails daily at bedtime for 6 months and monitor    Other medical conditions appear to be under reasonable control.  No anticipated changes in medications or labs at this time

## 2024-10-21 NOTE — LETTER
Patient: Alpa Gonzalez  : 1945    Encounter Date: 10/21/2024    Follow-up visit  Patient seen today in follow-up to her multiple medical problems.  She did undergo voice stay at the skilled nursing facility because of her debilitated status and need for additional assistance.  She did receive rehab and was transferred back to assisted living.  Since then there have been no specific issues other than staff concerned about some dystrophic nails.    Medications orders and prior labs were reviewed    Review of systems patient has no complaints.  No other issues reported by staff    Physical exam  Vital signs are stable the patient is afebrile.  She is obese.  Lungs are clear heart rate and rhythm is regular abdomen is soft nontender no significant peripheral edema  A few of her fingernails demonstrate dystrophic changes consistent with onychomycosis.    Assessment and care plan  Onychomycosis of the fingernails.  We will try ciclopirox 8% solution to the affected nails daily at bedtime for 6 months and monitor    Other medical conditions appear to be under reasonable control.  No anticipated changes in medications or labs at this time      Electronically Signed By: Angel Lozano DO   10/21/24  2:39 PM

## 2024-12-17 ENCOUNTER — NURSING HOME VISIT (OUTPATIENT)
Dept: POST ACUTE CARE | Facility: EXTERNAL LOCATION | Age: 79
End: 2024-12-17
Payer: MEDICARE

## 2024-12-17 DIAGNOSIS — F02.B0 MODERATE LEWY BODY DEMENTIA WITHOUT BEHAVIORAL DISTURBANCE, PSYCHOTIC DISTURBANCE, MOOD DISTURBANCE, OR ANXIETY (MULTI): ICD-10-CM

## 2024-12-17 DIAGNOSIS — E03.8 OTHER SPECIFIED HYPOTHYROIDISM: ICD-10-CM

## 2024-12-17 DIAGNOSIS — I10 PRIMARY HYPERTENSION: ICD-10-CM

## 2024-12-17 DIAGNOSIS — M06.9 RHEUMATOID ARTHRITIS, INVOLVING UNSPECIFIED SITE, UNSPECIFIED WHETHER RHEUMATOID FACTOR PRESENT (MULTI): ICD-10-CM

## 2024-12-17 DIAGNOSIS — G31.83 MODERATE LEWY BODY DEMENTIA WITHOUT BEHAVIORAL DISTURBANCE, PSYCHOTIC DISTURBANCE, MOOD DISTURBANCE, OR ANXIETY (MULTI): ICD-10-CM

## 2024-12-17 DIAGNOSIS — I50.33 ACUTE ON CHRONIC DIASTOLIC CONGESTIVE HEART FAILURE: Primary | ICD-10-CM

## 2024-12-17 DIAGNOSIS — D50.8 IRON DEFICIENCY ANEMIA SECONDARY TO INADEQUATE DIETARY IRON INTAKE: ICD-10-CM

## 2024-12-17 DIAGNOSIS — I82.412 DEEP VEIN THROMBOSIS (DVT) OF FEMORAL VEIN OF LEFT LOWER EXTREMITY, UNSPECIFIED CHRONICITY (MULTI): ICD-10-CM

## 2024-12-17 DIAGNOSIS — I82.512 CHRONIC DEEP VEIN THROMBOSIS (DVT) OF FEMORAL VEIN OF LEFT LOWER EXTREMITY (MULTI): ICD-10-CM

## 2024-12-17 DIAGNOSIS — E66.811 OBESITY (BMI 30.0-34.9): ICD-10-CM

## 2024-12-17 PROCEDURE — 99348 HOME/RES VST EST LOW MDM 30: CPT | Performed by: INTERNAL MEDICINE

## 2024-12-17 NOTE — LETTER
Patient: Alpa Gonzalez  : 1945    Encounter Date: 2024    Follow-up visit  Patient seen in follow-up for multiple medical problems.  She has no specific complaints.  Staff reports that a care conference was done yesterday with the patient and her son.  The patient appears more motivated to try to ambulate more and become more active and mobile.    Medications and orders are reviewed.  Prior labs were reviewed.    Physical exam  Blood pressure is 122/70, temps 97 5, pulse is 76 and pulse ox is 97% on room air.  HEENT is grossly unremarkable she is in no apparent distress.  She is obese and demonstrates marked generalized weakness requiring significant amount of assistance with transfer and ambulation.  No specific unilateral weakness or focal deficits or tremors noted    Assessment and care plan  Patient has a history of rheumatoid arthritis with generalized weakness and difficulty with mobility transfer ambulation and ADL care.  She also has obesity.  She seems quite motivated to try and continue to improve her mobility including transfer and ambulation.  We believe she would benefit from another round of physical and Occupational Therapy.  We will order this.    Patient's pulse ox on room air appears to be quite acceptable so she will no longer need continuous O2.  We will provide this on a as needed basis only.    Medications were reviewed.  Labs are reviewed.  Do not believe she continues to need iron supplementation so we will discontinue it.      Electronically Signed By: Angel Lozano DO   24 12:26 PM

## 2024-12-17 NOTE — PROGRESS NOTES
Follow-up visit  Patient seen in follow-up for multiple medical problems.  She has no specific complaints.  Staff reports that a care conference was done yesterday with the patient and her son.  The patient appears more motivated to try to ambulate more and become more active and mobile.    Medications and orders are reviewed.  Prior labs were reviewed.    Physical exam  Blood pressure is 122/70, temps 97 5, pulse is 76 and pulse ox is 97% on room air.  HEENT is grossly unremarkable she is in no apparent distress.  She is obese and demonstrates marked generalized weakness requiring significant amount of assistance with transfer and ambulation.  No specific unilateral weakness or focal deficits or tremors noted    Assessment and care plan  Patient has a history of rheumatoid arthritis with generalized weakness and difficulty with mobility transfer ambulation and ADL care.  She also has obesity.  She seems quite motivated to try and continue to improve her mobility including transfer and ambulation.  We believe she would benefit from another round of physical and Occupational Therapy.  We will order this.    Patient's pulse ox on room air appears to be quite acceptable so she will no longer need continuous O2.  We will provide this on a as needed basis only.    Medications were reviewed.  Labs are reviewed.  Do not believe she continues to need iron supplementation so we will discontinue it.

## 2024-12-19 PROCEDURE — G0180 MD CERTIFICATION HHA PATIENT: HCPCS | Performed by: INTERNAL MEDICINE

## 2025-01-13 ENCOUNTER — NURSING HOME VISIT (OUTPATIENT)
Dept: POST ACUTE CARE | Facility: EXTERNAL LOCATION | Age: 80
End: 2025-01-13
Payer: MEDICARE

## 2025-01-13 DIAGNOSIS — M06.9 RHEUMATOID ARTHRITIS, INVOLVING UNSPECIFIED SITE, UNSPECIFIED WHETHER RHEUMATOID FACTOR PRESENT (MULTI): ICD-10-CM

## 2025-01-13 DIAGNOSIS — E66.811 OBESITY (BMI 30.0-34.9): ICD-10-CM

## 2025-01-13 DIAGNOSIS — G31.83 MODERATE LEWY BODY DEMENTIA WITHOUT BEHAVIORAL DISTURBANCE, PSYCHOTIC DISTURBANCE, MOOD DISTURBANCE, OR ANXIETY (MULTI): Primary | ICD-10-CM

## 2025-01-13 DIAGNOSIS — F02.B0 MODERATE LEWY BODY DEMENTIA WITHOUT BEHAVIORAL DISTURBANCE, PSYCHOTIC DISTURBANCE, MOOD DISTURBANCE, OR ANXIETY (MULTI): Primary | ICD-10-CM

## 2025-01-13 PROCEDURE — 99347 HOME/RES VST EST SF MDM 20: CPT | Performed by: INTERNAL MEDICINE

## 2025-01-13 NOTE — PROGRESS NOTES
Follow-up visit  Patient seen today for further assessment of her need for physical and Occupational Therapy.    Medications and orders are reviewed.  Review of systems  No issues reported by staff.  She seems cooperative with attempts at increasing ambulation and mobility.    Physical exam  Vital signs are stable patient is afebrile  Patient ambulates guardedly with a walker and demonstrates generalized weakness including some difficulty in standing from a seated position.    Assessment and care plan  Patient has dementia as well as rheumatoid arthritis and obesity.  Her gait is guarded and a bit unsteady even with a walker and has difficulty getting in and out of a chair.  She will benefit from PT and OT to improve her functional capabilities and minimize her risk of falls

## 2025-01-13 NOTE — LETTER
Patient: Alpa Gonzalez  : 1945    Encounter Date: 2025    Follow-up visit  Patient seen today for further assessment of her need for physical and Occupational Therapy.    Medications and orders are reviewed.  Review of systems  No issues reported by staff.  She seems cooperative with attempts at increasing ambulation and mobility.    Physical exam  Vital signs are stable patient is afebrile  Patient ambulates guardedly with a walker and demonstrates generalized weakness including some difficulty in standing from a seated position.    Assessment and care plan  Patient has dementia as well as rheumatoid arthritis and obesity.  Her gait is guarded and a bit unsteady even with a walker and has difficulty getting in and out of a chair.  She will benefit from PT and OT to improve her functional capabilities and minimize her risk of falls      Electronically Signed By: Angel Lozano DO   25 12:05 PM

## 2025-02-19 ENCOUNTER — NURSING HOME VISIT (OUTPATIENT)
Dept: POST ACUTE CARE | Facility: EXTERNAL LOCATION | Age: 80
End: 2025-02-19
Payer: MEDICARE

## 2025-02-19 DIAGNOSIS — F02.B0 MODERATE LEWY BODY DEMENTIA WITHOUT BEHAVIORAL DISTURBANCE, PSYCHOTIC DISTURBANCE, MOOD DISTURBANCE, OR ANXIETY (MULTI): ICD-10-CM

## 2025-02-19 DIAGNOSIS — E03.8 OTHER SPECIFIED HYPOTHYROIDISM: ICD-10-CM

## 2025-02-19 DIAGNOSIS — G31.83 MODERATE LEWY BODY DEMENTIA WITHOUT BEHAVIORAL DISTURBANCE, PSYCHOTIC DISTURBANCE, MOOD DISTURBANCE, OR ANXIETY (MULTI): ICD-10-CM

## 2025-02-19 DIAGNOSIS — N18.31 STAGE 3A CHRONIC KIDNEY DISEASE (MULTI): ICD-10-CM

## 2025-02-19 DIAGNOSIS — I50.33 ACUTE ON CHRONIC DIASTOLIC CONGESTIVE HEART FAILURE: Primary | ICD-10-CM

## 2025-02-19 DIAGNOSIS — R19.5 LOOSE STOOLS: ICD-10-CM

## 2025-02-19 DIAGNOSIS — I82.512 CHRONIC DEEP VEIN THROMBOSIS (DVT) OF FEMORAL VEIN OF LEFT LOWER EXTREMITY (MULTI): ICD-10-CM

## 2025-02-19 PROCEDURE — 99349 HOME/RES VST EST MOD MDM 40: CPT | Performed by: INTERNAL MEDICINE

## 2025-02-19 NOTE — PROGRESS NOTES
Follow-up visit  Patient seen today because of family concern of some increased behaviors consisting of perhaps some increased confusion and manic type behavior.  Also noted by staff in the family is that she has been having increased amount of loose stools for some time.  The loose stools appear to be somewhat of a chronic problem off-and-on but it recurred again.  Family reports that sometimes when she has had infections like C. difficile or UTI that her behaviors change and they were concerned about that    Medications and orders are reviewed.  Weights have been reasonably stable.    Review of systems  Patient has no specific complaints.  No shortness of breath or cough  No complaints of abdominal pain nausea or vomiting.  Staff does report having loose stools couple to a few times a day.    Physical exam  Vital signs appear to be stable she is afebrile  HEENT is grossly unremarkable.  Lungs are clear heart rate rhythm is regular.  Abdomen is obese but soft nontender  There is scant bilateral pedal edema and leg edema.  A little bit more so on the left where she has had a history of chronic DVTs  No focal deficits or tremors.  She appears to be alert pleasant conversant and appropriate in response without any significant agitation or confusion from her baseline    Assessment and care plan  Recurrent episode of loose stools.  This may be secondary to her medication or may be functional as she has on multiple medications that may be causing or contributing to diarrhea.  Nonetheless we will obtain a stool for C. difficile.  We will also obtain a UA with CNS in case this may be contributing to some behavioral changes although clinically she looks appropriate at this time    It has been almost a year since her last update of labs so we will obtain a CBC a CMP along with magnesium and a TSH

## 2025-02-19 NOTE — LETTER
Patient: Alpa Gonzalez  : 1945    Encounter Date: 2025    Follow-up visit  Patient seen today because of family concern of some increased behaviors consisting of perhaps some increased confusion and manic type behavior.  Also noted by staff in the family is that she has been having increased amount of loose stools for some time.  The loose stools appear to be somewhat of a chronic problem off-and-on but it recurred again.  Family reports that sometimes when she has had infections like C. difficile or UTI that her behaviors change and they were concerned about that    Medications and orders are reviewed.  Weights have been reasonably stable.    Review of systems  Patient has no specific complaints.  No shortness of breath or cough  No complaints of abdominal pain nausea or vomiting.  Staff does report having loose stools couple to a few times a day.    Physical exam  Vital signs appear to be stable she is afebrile  HEENT is grossly unremarkable.  Lungs are clear heart rate rhythm is regular.  Abdomen is obese but soft nontender  There is scant bilateral pedal edema and leg edema.  A little bit more so on the left where she has had a history of chronic DVTs  No focal deficits or tremors.  She appears to be alert pleasant conversant and appropriate in response without any significant agitation or confusion from her baseline    Assessment and care plan  Recurrent episode of loose stools.  This may be secondary to her medication or may be functional as she has on multiple medications that may be causing or contributing to diarrhea.  Nonetheless we will obtain a stool for C. difficile.  We will also obtain a UA with CNS in case this may be contributing to some behavioral changes although clinically she looks appropriate at this time    It has been almost a year since her last update of labs so we will obtain a CBC a CMP along with magnesium and a TSH      Electronically Signed By: Angel Lozano DO    2/19/25  3:04 PM

## 2025-04-22 ENCOUNTER — NURSING HOME VISIT (OUTPATIENT)
Dept: POST ACUTE CARE | Facility: EXTERNAL LOCATION | Age: 80
End: 2025-04-22
Payer: MEDICARE

## 2025-04-22 DIAGNOSIS — G31.83 MODERATE LEWY BODY DEMENTIA WITHOUT BEHAVIORAL DISTURBANCE, PSYCHOTIC DISTURBANCE, MOOD DISTURBANCE, OR ANXIETY (MULTI): ICD-10-CM

## 2025-04-22 DIAGNOSIS — N18.31 STAGE 3A CHRONIC KIDNEY DISEASE (MULTI): ICD-10-CM

## 2025-04-22 DIAGNOSIS — I82.512 CHRONIC DEEP VEIN THROMBOSIS (DVT) OF FEMORAL VEIN OF LEFT LOWER EXTREMITY: Primary | ICD-10-CM

## 2025-04-22 DIAGNOSIS — R53.1 GENERAL WEAKNESS: ICD-10-CM

## 2025-04-22 DIAGNOSIS — E66.01 OBESITY, CLASS III, BMI 40-49.9 (MORBID OBESITY) (MULTI): ICD-10-CM

## 2025-04-22 DIAGNOSIS — F02.B0 MODERATE LEWY BODY DEMENTIA WITHOUT BEHAVIORAL DISTURBANCE, PSYCHOTIC DISTURBANCE, MOOD DISTURBANCE, OR ANXIETY (MULTI): ICD-10-CM

## 2025-04-22 PROCEDURE — 99347 HOME/RES VST EST SF MDM 20: CPT | Performed by: INTERNAL MEDICINE

## 2025-04-22 NOTE — PROGRESS NOTES
Follow-up visit  Patient seen in follow-up for multiple medical problems.  Last visit there was some concern about loose stools but there has been no persistent report of this at this point.  Labs are unremarkable.    Medications orders and labs were reviewed.    Review of systems  Patient has no specific complaints    Physical exam  Vital signs are stable the patient is afebrile.  Blood pressure appears to be adequately controlled she is alert pleasant no apparent distress.  Lungs are clear heart rate and rhythm is regular.  No significant peripheral edema    Assessment and care plan  Overall the patient appears to be doing reasonably well at this time.  No further reports of recurrent diarrhea.  We will continue current regimen and monitor

## 2025-04-22 NOTE — LETTER
Patient: Alpa Gonzalez  : 1945    Encounter Date: 2025    Follow-up visit  Patient seen in follow-up for multiple medical problems.  Last visit there was some concern about loose stools but there has been no persistent report of this at this point.  Labs are unremarkable.    Medications orders and labs were reviewed.    Review of systems  Patient has no specific complaints    Physical exam  Vital signs are stable the patient is afebrile.  Blood pressure appears to be adequately controlled she is alert pleasant no apparent distress.  Lungs are clear heart rate and rhythm is regular.  No significant peripheral edema    Assessment and care plan  Overall the patient appears to be doing reasonably well at this time.  No further reports of recurrent diarrhea.  We will continue current regimen and monitor    Electronically Signed By: Angel Lozano DO   25 12:01 PM

## 2025-06-10 ENCOUNTER — NURSING HOME VISIT (OUTPATIENT)
Dept: POST ACUTE CARE | Facility: EXTERNAL LOCATION | Age: 80
End: 2025-06-10
Payer: MEDICARE

## 2025-06-10 DIAGNOSIS — N18.31 STAGE 3A CHRONIC KIDNEY DISEASE (MULTI): ICD-10-CM

## 2025-06-10 DIAGNOSIS — G31.83 MODERATE LEWY BODY DEMENTIA WITHOUT BEHAVIORAL DISTURBANCE, PSYCHOTIC DISTURBANCE, MOOD DISTURBANCE, OR ANXIETY (MULTI): ICD-10-CM

## 2025-06-10 DIAGNOSIS — I82.512 CHRONIC DEEP VEIN THROMBOSIS (DVT) OF FEMORAL VEIN OF LEFT LOWER EXTREMITY: ICD-10-CM

## 2025-06-10 DIAGNOSIS — E66.813 OBESITY, CLASS III, BMI 40-49.9 (MORBID OBESITY): ICD-10-CM

## 2025-06-10 DIAGNOSIS — E03.8 OTHER SPECIFIED HYPOTHYROIDISM: ICD-10-CM

## 2025-06-10 DIAGNOSIS — J96.01 ACUTE RESPIRATORY FAILURE WITH HYPOXIA: ICD-10-CM

## 2025-06-10 DIAGNOSIS — N30.00 ACUTE CYSTITIS WITHOUT HEMATURIA: ICD-10-CM

## 2025-06-10 DIAGNOSIS — I50.33 ACUTE ON CHRONIC DIASTOLIC CONGESTIVE HEART FAILURE: Primary | ICD-10-CM

## 2025-06-10 DIAGNOSIS — N17.9 AKI (ACUTE KIDNEY INJURY): ICD-10-CM

## 2025-06-10 DIAGNOSIS — F02.B0 MODERATE LEWY BODY DEMENTIA WITHOUT BEHAVIORAL DISTURBANCE, PSYCHOTIC DISTURBANCE, MOOD DISTURBANCE, OR ANXIETY (MULTI): ICD-10-CM

## 2025-06-10 DIAGNOSIS — E87.5 HYPERKALEMIA: ICD-10-CM

## 2025-06-10 PROCEDURE — 99349 HOME/RES VST EST MOD MDM 40: CPT | Performed by: INTERNAL MEDICINE

## 2025-06-10 NOTE — LETTER
Patient: Alpa Gonzalez  : 1945    Encounter Date: 06/10/2025    Follow-up visit  Patient was recently hospitalized for change in status.  Hospital workup and management indicated that the patient had a degree of acute hypoxia possibly secondary pneumonia or bronchitis.  During the hospital course she also developed JOSE DANIEL and hyperkalemia.  She was also felt to have a possible UTI.  The above conditions were managed and improved.  She was sent back to our facility.    Medications and orders labs were reviewed.  Hospital notes were reviewed.    Review of systems  Patient denies any pain.  She denies any shortness of breath or cough.  No other symptoms reported.  Staff report no significant issues.    Physical exam  Blood pressure is 124/74, temps 97.6, pulse is 70, pulse ox is 96% on room air  She appears alert pleasant no apparent distress.  Obese.  Lungs are clear.  Heart rate and rhythm is regular.  No peripheral edema.  Repeated lab was done and demonstrated a BUN of 49 and creatinine of 1.1 potassium was 4.7.  This was somewhat consistent with hospital abnormal values but were up from her usual baseline.  Her TSH is 0.1.    Assessment and care plan  Resolved hypoxia likely secondary to acute bronchitis or early pneumonia complicated by her obesity.  This appears to be improved.  JOSE DANIEL on CKD.  We will continue to monitor discontinue her furosemide for now and recheck labs in about a week.  Specifically BMP.    Hypothyroidism.  Is possible that she is getting too much levothyroxine however prior free T4's were within normal limits.  We will her levothyroxine to 88 mcg daily from 100 mcg.  We will repeat a TSH along with a free T4 in approximately 6 to 8 weeks.    Electronically Signed By: Angel Lozano DO   6/10/25  1:38 PM

## 2025-06-10 NOTE — PROGRESS NOTES
Follow-up visit  Patient was recently hospitalized for change in status.  Hospital workup and management indicated that the patient had a degree of acute hypoxia possibly secondary pneumonia or bronchitis.  During the hospital course she also developed JOSE DANIEL and hyperkalemia.  She was also felt to have a possible UTI.  The above conditions were managed and improved.  She was sent back to our facility.    Medications and orders labs were reviewed.  Hospital notes were reviewed.    Review of systems  Patient denies any pain.  She denies any shortness of breath or cough.  No other symptoms reported.  Staff report no significant issues.    Physical exam  Blood pressure is 124/74, temps 97.6, pulse is 70, pulse ox is 96% on room air  She appears alert pleasant no apparent distress.  Obese.  Lungs are clear.  Heart rate and rhythm is regular.  No peripheral edema.  Repeated lab was done and demonstrated a BUN of 49 and creatinine of 1.1 potassium was 4.7.  This was somewhat consistent with hospital abnormal values but were up from her usual baseline.  Her TSH is 0.1.    Assessment and care plan  Resolved hypoxia likely secondary to acute bronchitis or early pneumonia complicated by her obesity.  This appears to be improved.  JOSE DANIEL on CKD.  We will continue to monitor discontinue her furosemide for now and recheck labs in about a week.  Specifically BMP.    Hypothyroidism.  Is possible that she is getting too much levothyroxine however prior free T4's were within normal limits.  We will her levothyroxine to 88 mcg daily from 100 mcg.  We will repeat a TSH along with a free T4 in approximately 6 to 8 weeks.

## 2025-06-17 ENCOUNTER — NURSING HOME VISIT (OUTPATIENT)
Dept: POST ACUTE CARE | Facility: EXTERNAL LOCATION | Age: 80
End: 2025-06-17
Payer: MEDICARE

## 2025-06-17 DIAGNOSIS — G31.83 MODERATE LEWY BODY DEMENTIA WITHOUT BEHAVIORAL DISTURBANCE, PSYCHOTIC DISTURBANCE, MOOD DISTURBANCE, OR ANXIETY (MULTI): ICD-10-CM

## 2025-06-17 DIAGNOSIS — E66.813 OBESITY, CLASS III, BMI 40-49.9 (MORBID OBESITY): ICD-10-CM

## 2025-06-17 DIAGNOSIS — I50.33 ACUTE ON CHRONIC DIASTOLIC CONGESTIVE HEART FAILURE: Primary | ICD-10-CM

## 2025-06-17 DIAGNOSIS — R53.1 GENERAL WEAKNESS: ICD-10-CM

## 2025-06-17 DIAGNOSIS — M06.9 RHEUMATOID ARTHRITIS, INVOLVING UNSPECIFIED SITE, UNSPECIFIED WHETHER RHEUMATOID FACTOR PRESENT (MULTI): ICD-10-CM

## 2025-06-17 DIAGNOSIS — F02.B0 MODERATE LEWY BODY DEMENTIA WITHOUT BEHAVIORAL DISTURBANCE, PSYCHOTIC DISTURBANCE, MOOD DISTURBANCE, OR ANXIETY (MULTI): ICD-10-CM

## 2025-06-17 PROCEDURE — 99348 HOME/RES VST EST LOW MDM 30: CPT | Performed by: INTERNAL MEDICINE

## 2025-06-17 NOTE — LETTER
Patient: Alpa Gonzalez  : 1945    Encounter Date: 2025    Follow-up visit.  Patient seen today in follow-up to concerns by the staff that since the patient's return from the hospital she has been less mobile and ambulatory.  They are asking for consideration of physical therapy.    Medications and orders are reviewed.    Review of systems  Patient has no specific complaints and no specific issues otherwise reported by staff    Physical exam  Patient is alert pleasant no apparent distress vital signs are stable she is afebrile.  Lungs demonstrate diminished breath sounds and slight crackles in the right base.  Room air pulse ox was approximately 96% but she has oxygen to use as needed.  No focal deficits or tremors noted.  We asked the patient to stand but she was unable to stand on her own from the wheelchair seated position.  It is evident that she is quite weak.    Assessment and care plan  Marked generalized weakness along with dementia rheumatoid arthritis and morbid obesity.  We will ask for physical therapy to assess and evaluate and treat her accordingly    Electronically Signed By: Angel Lozano DO   25 11:05 AM

## 2025-06-17 NOTE — PROGRESS NOTES
Follow-up visit.  Patient seen today in follow-up to concerns by the staff that since the patient's return from the hospital she has been less mobile and ambulatory.  They are asking for consideration of physical therapy.    Medications and orders are reviewed.    Review of systems  Patient has no specific complaints and no specific issues otherwise reported by staff    Physical exam  Patient is alert pleasant no apparent distress vital signs are stable she is afebrile.  Lungs demonstrate diminished breath sounds and slight crackles in the right base.  Room air pulse ox was approximately 96% but she has oxygen to use as needed.  No focal deficits or tremors noted.  We asked the patient to stand but she was unable to stand on her own from the wheelchair seated position.  It is evident that she is quite weak.    Assessment and care plan  Marked generalized weakness along with dementia rheumatoid arthritis and morbid obesity.  We will ask for physical therapy to assess and evaluate and treat her accordingly